# Patient Record
Sex: FEMALE | Race: WHITE
[De-identification: names, ages, dates, MRNs, and addresses within clinical notes are randomized per-mention and may not be internally consistent; named-entity substitution may affect disease eponyms.]

---

## 2020-12-03 ENCOUNTER — HOSPITAL ENCOUNTER (INPATIENT)
Dept: HOSPITAL 7 - FB.MS | Age: 80
LOS: 13 days | Discharge: HOME HEALTH | DRG: 560 | End: 2020-12-16
Attending: FAMILY MEDICINE | Admitting: FAMILY MEDICINE
Payer: MEDICARE

## 2020-12-03 DIAGNOSIS — I25.118: ICD-10-CM

## 2020-12-03 DIAGNOSIS — F41.9: ICD-10-CM

## 2020-12-03 DIAGNOSIS — Z96.642: ICD-10-CM

## 2020-12-03 DIAGNOSIS — J44.9: ICD-10-CM

## 2020-12-03 DIAGNOSIS — R53.81: ICD-10-CM

## 2020-12-03 DIAGNOSIS — K21.9: ICD-10-CM

## 2020-12-03 DIAGNOSIS — R07.89: ICD-10-CM

## 2020-12-03 DIAGNOSIS — F33.1: ICD-10-CM

## 2020-12-03 DIAGNOSIS — D63.1: ICD-10-CM

## 2020-12-03 DIAGNOSIS — K29.70: ICD-10-CM

## 2020-12-03 DIAGNOSIS — M54.9: ICD-10-CM

## 2020-12-03 DIAGNOSIS — G89.29: ICD-10-CM

## 2020-12-03 DIAGNOSIS — E78.00: ICD-10-CM

## 2020-12-03 DIAGNOSIS — K29.50: ICD-10-CM

## 2020-12-03 DIAGNOSIS — H91.90: ICD-10-CM

## 2020-12-03 DIAGNOSIS — G47.30: ICD-10-CM

## 2020-12-03 DIAGNOSIS — I12.9: ICD-10-CM

## 2020-12-03 DIAGNOSIS — N18.30: ICD-10-CM

## 2020-12-03 DIAGNOSIS — H54.7: ICD-10-CM

## 2020-12-03 DIAGNOSIS — E87.5: ICD-10-CM

## 2020-12-03 DIAGNOSIS — R00.1: ICD-10-CM

## 2020-12-03 DIAGNOSIS — N17.9: ICD-10-CM

## 2020-12-03 DIAGNOSIS — K56.7: ICD-10-CM

## 2020-12-03 DIAGNOSIS — Z98.49: ICD-10-CM

## 2020-12-03 DIAGNOSIS — J98.11: ICD-10-CM

## 2020-12-03 DIAGNOSIS — Z79.899: ICD-10-CM

## 2020-12-03 DIAGNOSIS — Z79.82: ICD-10-CM

## 2020-12-03 DIAGNOSIS — J30.9: ICD-10-CM

## 2020-12-03 DIAGNOSIS — K59.00: ICD-10-CM

## 2020-12-03 DIAGNOSIS — Z47.1: Primary | ICD-10-CM

## 2020-12-03 PROCEDURE — P9016 RBC LEUKOCYTES REDUCED: HCPCS

## 2020-12-03 RX ADMIN — LACTULOSE SCH GM: 10 SOLUTION ORAL at 20:00

## 2020-12-03 RX ADMIN — MORPHINE SULFATE SCH MG: 15 TABLET, EXTENDED RELEASE ORAL at 20:10

## 2020-12-03 RX ADMIN — MOMETASONE FUROATE AND FORMOTEROL FUMARATE DIHYDRATE SCH PUFF: 200; 5 AEROSOL RESPIRATORY (INHALATION) at 20:00

## 2020-12-03 NOTE — PCM.HP.2
H&P History of Present Illness





- General


Date of Service: 12/03/20


Admit Problem/Dx: 


                           Admission Diagnosis/Problem





Admission Diagnosis/Problem      Hip fracture requiring operative repair








Source of Information: Patient


History Limitations: Reports: No Limitations





- History of Present Illness


Initial Comments - Free Text/Narative: 


Deena is a 81 yo female who had CARRILLO( left) last week.She is here for rehab.She

complains of being tired,but her hip pain is much improved,and relived by 

Tyenol. She had post anemia( hgb 7.9) on discharge. Her pas medical history is 

extensive,with HTN,CAD,COPD,chronic chest pain,anxiety,Constipation,CKD Stage 

III 


  ** left hip


Pain Score (Numeric/FACES): 5





- Related Data


Allergies/Adverse Reactions: 


                                    Allergies











Allergy/AdvReac Type Severity Reaction Status Date / Time


 


No Known Allergies Allergy   Verified 11/21/18 09:00











Home Medications: 


                                    Home Meds





Omeprazole [Prilosec] 20 mg PO DAILY@0600 12/06/14 [History]


atorvaSTATin [Lipitor] 10 mg PO BEDTIME 11/20/18 [History]


Acetaminophen [Tylenol] 650 mg PO Q4H PRN 12/03/20 [History]


Albuterol Sulfate 3 ml IH QID PRN 12/03/20 [History]


Albuterol [Ventolin HFA] 2 puff IH Q4H PRN 12/03/20 [History]


Aspirin [Halfprin] 81 mg PO BID 12/03/20 [History]


Diltiazem [Dilacor XR] 240 mg PO DAILY 12/03/20 [History]


Docusate Sodium 100 mg PO DAILY 12/03/20 [History]


Docusate Sodium/Sennosides [Senna Plus] 1 tab PO BID 12/03/20 [History]


Ferrous Fumarate/Vitamin C [Vitron-C] 1 tab PO DAILY 12/03/20 [History]


Fluticasone Propion/Salmeterol [Advair 250-50 Diskus] 1 puff IH BID 12/03/20 

[History]


Lactulose [Chronulac] 15 ml PO TID 12/03/20 [History]


Morphine [MS Contin] 15 mg PO BID 12/03/20 [History]


Ondansetron [Zofran ODT] 4 mg PO Q4H PRN 12/03/20 [History]


Oxybutynin 5 mg PO TID 12/03/20 [History]


Venlafaxine [Effexor XR] 150 mg PO DAILY 12/03/20 [History]


oxyCODONE 5 mg PO Q4H PRN 12/03/20 [History]


oxyCODONE 10 mg PO Q4H PRN 12/03/20 [History]


polyethylene glycoL 3350 [MiraLAX] 17 gm PO DAILY 12/03/20 [History]











Past Medical History


HEENT History: Reports: Allergic Rhinitis, Cataract, Glaucoma, Hard of Hearing, 

Impaired Vision


Cardiovascular History: Reports: High Cholesterol, Hypertension, Other (See 

Below)


Other Cardiovascular History: ASHD


Respiratory History: Reports: Sleep Apnea, Other (See Below)


Other Respiratory History: TOBACCO USER


Gastrointestinal History: Reports: Colon Polyp, GERD


Genitourinary History: Reports: Urinary Incontinence


OB/GYN History: Reports: Pregnancy, Other (See Below)


Other OB/BYN History: ABNORMAL PAP


Musculoskeletal History: Reports: Back Pain, Chronic, Other (See Below)


Other Musculoskeletal History: LUMBAR SPINAL STENOSIS, DDD


Neurological History: 


Psychiatric History: Reports: Depression


Endocrine/Metabolic History: Reports: None


Hematologic History: Reports: Anemia


Immunologic History: Reports: None


Oncologic (Cancer) History: Reports: None


Dermatologic History: Reports: None





- Past Surgical History


Head Surgeries/Procedures: Reports: None


HEENT Surgical History: Reports: Cataract Surgery, Eye Surgery


Cardiovascular Surgical History: Reports: None


Respiratory Surgical History: Reports: None


GI Surgical History: Reports: Colonoscopy


Female  Surgical History: Reports: None


Endocrine Surgical History: Reports: None


Neurological Surgical History: Reports: Laminectomy, Lumbar Spine, Spinal Fusion


Musculoskeletal Surgical History: Reports: None


Oncologic Surgical History: Reports: None


Dermatological Surgical History: Reports: None





Social & Family History





- Family History


Family Medical History: No Pertinent Family History





- Caffeine Use


Caffeine Use: Reports: None





H&P Review of Systems





- Review of Systems:


Review Of Systems: Comprehensive ROS is negative, except as noted in HPI.





Exam





- Exam


Exam: See Below





- Vital Signs


Vital Signs: 


                                Last Vital Signs











Temp  98.4 F   12/03/20 14:15


 


Pulse  59 L  12/03/20 14:15


 


Resp  15   12/03/20 14:15


 


BP  125/46 L  12/03/20 14:15


 


Pulse Ox  94 L  12/03/20 14:15











Weight: 71.668 kg





- Exam


Quality Assessment: DVT Prophylaxis


General: Alert


HEENT: PERRLA


Neck: Supple


Lungs: Clear to Auscultation


Cardiovascular: Regular Rate


GI/Abdominal Exam: Normal Bowel Sounds, Soft


Extremities: Normal Inspection


Neurological: Cranial Nerves Intact


Neuro Extensive - Mental Status: Alert, Oriented x3


Psychiatric: Alert, Normal Affect





Sepsis Event Note





- Evaluation


Sepsis Screening Result: No Definite Risk





- Focused Exam


Vital Signs: 


                                   Vital Signs











  Temp Pulse Resp BP Pulse Ox


 


 12/03/20 14:15  98.4 F  59 L  15  125/46 L  94 L














- Problem List


(1) H/O total hip arthroplasty


SNOMED Code(s): 494292751215, 905056235010


   ICD Code: Z96.649 - PRESENCE OF UNSPECIFIED ARTIFICIAL HIP JOINT   Status: 

Acute   Current Visit: Yes   


Qualifiers: 


   Laterality: left   Qualified Code(s): Z96.642 - Presence of left artificial 

hip joint   





(2) Debility


SNOMED Code(s): 08878829


   ICD Code: R53.81 - OTHER MALAISE   Status: Acute   Current Visit: Yes   





(3) HTN (hypertension)


SNOMED Code(s): 60164434


   ICD Code: I10 - ESSENTIAL (PRIMARY) HYPERTENSION   Status: Chronic   Current 

Visit: Yes   


Qualifiers: 


   Hypertension type: essential hypertension   Qualified Code(s): I10 - 

Essential (primary) hypertension   





(4) CAD (coronary artery disease)


SNOMED Code(s): 24680250


   ICD Code: I25.10 - ATHSCL HEART DISEASE OF NATIVE CORONARY ARTERY W/O ANG 

PCTRS   Status: Chronic   Current Visit: Yes   


Qualifiers: 


   Coronary Disease-Associated Artery/Lesion type: native artery   Native vs. 

transplanted heart: native heart   Associated angina: with stable angina   

Qualified Code(s): I25.118 - Atherosclerotic heart disease of native coronary 

artery with other forms of angina pectoris   





(5) MDD (major depressive disorder)


SNOMED Code(s): 479778351


   ICD Code: F32.9 - MAJOR DEPRESSIVE DISORDER, SINGLE EPISODE, UNSPECIFIED   

Status: Acute   Current Visit: Yes   


Qualifiers: 


   Major depression recurrence: recurrent   Active/Remission status: currently 

active   Major depression episode severity: moderate   Qualified Code(s): F33.1 

- Major depressive disorder, recurrent, moderate   





(6) Constipation


SNOMED Code(s): 15400731


   ICD Code: K59.00 - CONSTIPATION, UNSPECIFIED   Status: Chronic   Current 

Visit: Yes   


Qualifiers: 


   Constipation type: unspecified constipation type   Qualified Code(s): K59.00 

- Constipation, unspecified   





(7) Anemia


SNOMED Code(s): 729506465


   ICD Code: D64.9 - ANEMIA, UNSPECIFIED   Status: Acute   Current Visit: Yes   


Qualifiers: 


   Anemia type: iron deficiency 





(8) Constipation


SNOMED Code(s): 61468085


   ICD Code: K59.00 - CONSTIPATION, UNSPECIFIED   Status: Chronic   Current 

Visit: No   


Qualifiers: 


   Constipation type: unspecified constipation type   Qualified Code(s): K59.00 

- Constipation, unspecified   


Problem List Initiated/Reviewed/Updated: Yes


Orders Last 24hrs: 


                               Active Orders 24 hr











 Category Date Time Status


 


 Patient Status [ADT] Routine ADT  12/03/20 14:47 Active


 


 Height and Weight [RC] WEEKLY Care  12/03/20 14:47 Active


 


 Oxygen Therapy [RC] PRN Care  12/03/20 14:47 Active


 


 RT Aerosol Therapy [RC] ASDIRECTED Care  12/03/20 14:52 Active


 


 RT Post Treatment Assessment [RC] Click to Edit Care  12/03/20 14:52 Active


 


 Up With Assistance [RC] ASDIRECTED Care  12/03/20 14:47 Active


 


 VTE/DVT Education [RC] Per Unit Routine Care  12/03/20 14:47 Active


 


 Vital Signs [RC] PER UNIT ROUTINE Care  12/03/20 14:47 Active


 


 OT Evaluation and Treatment [CONS] Routine Cons  12/03/20 14:47 Active


 


 PT Evaluation and Treatment [CONS] Routine Cons  12/03/20 14:47 Active


 


 Regular Diet [DIET] Diet  12/03/20 Breakfast Active


 


 Acetaminophen [TylenoL] Med  12/03/20 14:51 Active





 650 mg PO Q4H PRN   


 


 Albuterol [Proventil Neb Soln] Med  12/03/20 14:51 Active





 2.5 mg NEB QID PRN   


 


 Albuterol [Ventolin HFA] Med  12/03/20 14:51 Active





 0 gm INH Q4H PRN   


 


 Ascorbic Acid [Vitamin C] Med  12/04/20 09:00 Active





 500 mg PO DAILY   


 


 Aspirin [Halfprin] Med  12/03/20 21:00 Active





 81 mg PO BID   


 


 Diltiazem [Dilacor XR] Med  12/04/20 09:00 Active





 240 mg PO DAILY   


 


 Docusate Sodium [Colace] Med  12/04/20 09:00 Active





 100 mg PO DAILY   


 


 Docusate Sodium/Sennosides [Senna Plus] Med  12/03/20 21:00 Active





 1 tab PO BID   


 


 Ferrous Sulfate Med  12/04/20 09:00 Active





 325 mg PO DAILY   


 


 Lactulose [Chronulac] Med  12/03/20 21:00 Active





 10 gm PO TID   


 


 Mometasone/Formoterol [Dulera 200-5 MCG] Med  12/03/20 21:00 Active





 2 puff IH BID   


 


 Morphine [MS Contin] Med  12/03/20 21:00 Active





 15 mg PO BID   


 


 Ondansetron [Zofran ODT] Med  12/03/20 14:51 Active





 4 mg PO Q4H PRN   


 


 Oxybutynin Med  12/03/20 15:00 Active





 5 mg PO TID   


 


 Pantoprazole [ProTONIX***] Med  12/04/20 06:00 Active





 40 mg PO DAILY@0600   


 


 Venlafaxine [Effexor XR] Med  12/04/20 09:00 Active





 150 mg PO DAILY   


 


 atorvaSTATin [Lipitor] Med  12/03/20 21:00 Active





 10 mg PO BEDTIME   


 


 oxyCODONE Med  12/03/20 14:51 Active





 10 mg PO Q4H PRN   


 


 oxyCODONE Med  12/03/20 14:51 Active





 5 mg PO Q4H PRN   


 


 polyethylene glycoL 3350 [MiraLAX] Med  12/04/20 09:00 Active





 17 gm PO DAILY   


 


 Resuscitation Status Routine Resus Stat  12/03/20 14:47 Ordered








                                Medication Orders





Acetaminophen (Tylenol)  650 mg PO Q4H PRN


   PRN Reason: mild pain


   Last Admin: 12/03/20 15:52  Dose: 650 mg


   Documented by: JON


Albuterol (Ventolin Hfa)  0 gm INH Q4H PRN


   PRN Reason: Shortness of Breath


Albuterol (Proventil Neb Soln)  2.5 mg NEB QID PRN


   PRN Reason: Shortness of Breath


Ascorbic Acid (Vitamin C)  500 mg PO DAILY [facility]


Aspirin (Halfprin)  81 mg PO BID [facility]


   Stop: 12/30/20 23:59


Atorvastatin Calcium (Lipitor)  10 mg PO BEDTIME [facility]


Diltiazem HCl (Dilacor Xr)  240 mg PO DAILY [facility]


Docusate Sodium (Colace)  100 mg PO DAILY [facility]


Ferrous Sulfate (Ferrous Sulfate)  325 mg PO DAILY [facility]


Lactulose (Chronulac)  10 gm PO TID [facility]


Mometasone Furoate/Formoterol Fumar (Dulera 200-5 Mcg)  2 puff IH BID [facility]


Morphine Sulfate (Ms Contin)  15 mg PO BID [facility]


Ondansetron HCl (Zofran Odt)  4 mg PO Q4H PRN


   PRN Reason: Nausea


Oxybutynin Chloride (Oxybutynin)  5 mg PO TID [facility]


   Last Admin: 12/03/20 15:52  Dose: 5 mg


   Documented by: JON


Oxycodone HCl (Oxycodone)  5 mg PO Q4H PRN


   PRN Reason: pain 4-6/10


Oxycodone HCl (Oxycodone)  10 mg PO Q4H PRN


   PRN Reason: pain 7-10/10


Pantoprazole Sodium (Protonix***)  40 mg PO DAILY@0600 [facility]


Polyethylene Glycol (Miralax)  17 gm PO DAILY [facility]


Senna/Docusate Sodium (Senna Plus)  1 tab PO BID [facility]


Venlafaxine HCl (Effexor Xr)  150 mg PO DAILY [facility]








Assessment/Plan Comment:: 


Admit to [facility] bed/with orders from [facility]. PT/OT consult.Resume Home Meds-I 

appreciate Pharmacy's help.

## 2020-12-04 RX ADMIN — LACTULOSE SCH GM: 10 SOLUTION ORAL at 15:05

## 2020-12-04 RX ADMIN — MORPHINE SULFATE SCH MG: 15 TABLET, EXTENDED RELEASE ORAL at 08:34

## 2020-12-04 RX ADMIN — LACTULOSE SCH GM: 10 SOLUTION ORAL at 08:29

## 2020-12-04 RX ADMIN — DILTIAZEM HYDROCHLORIDE SCH MG: 240 CAPSULE, EXTENDED RELEASE ORAL at 08:29

## 2020-12-04 RX ADMIN — ONDANSETRON PRN MG: 4 TABLET, ORALLY DISINTEGRATING ORAL at 20:32

## 2020-12-04 RX ADMIN — MORPHINE SULFATE SCH MG: 15 TABLET, EXTENDED RELEASE ORAL at 20:33

## 2020-12-04 RX ADMIN — MOMETASONE FUROATE AND FORMOTEROL FUMARATE DIHYDRATE SCH PUFF: 200; 5 AEROSOL RESPIRATORY (INHALATION) at 08:28

## 2020-12-04 RX ADMIN — ONDANSETRON PRN MG: 4 TABLET, ORALLY DISINTEGRATING ORAL at 05:57

## 2020-12-04 RX ADMIN — LACTULOSE SCH GM: 10 SOLUTION ORAL at 20:27

## 2020-12-04 RX ADMIN — MOMETASONE FUROATE AND FORMOTEROL FUMARATE DIHYDRATE SCH PUFF: 200; 5 AEROSOL RESPIRATORY (INHALATION) at 20:27

## 2020-12-05 RX ADMIN — DILTIAZEM HYDROCHLORIDE SCH MG: 240 CAPSULE, EXTENDED RELEASE ORAL at 08:26

## 2020-12-05 RX ADMIN — ONDANSETRON PRN MG: 4 TABLET, ORALLY DISINTEGRATING ORAL at 11:06

## 2020-12-05 RX ADMIN — MOMETASONE FUROATE AND FORMOTEROL FUMARATE DIHYDRATE SCH PUFF: 200; 5 AEROSOL RESPIRATORY (INHALATION) at 08:18

## 2020-12-05 RX ADMIN — MOMETASONE FUROATE AND FORMOTEROL FUMARATE DIHYDRATE SCH PUFF: 200; 5 AEROSOL RESPIRATORY (INHALATION) at 20:36

## 2020-12-05 RX ADMIN — LACTULOSE SCH GM: 10 SOLUTION ORAL at 08:18

## 2020-12-05 RX ADMIN — LACTULOSE SCH GM: 10 SOLUTION ORAL at 20:36

## 2020-12-05 RX ADMIN — MORPHINE SULFATE SCH MG: 15 TABLET, EXTENDED RELEASE ORAL at 08:20

## 2020-12-05 RX ADMIN — LACTULOSE SCH GM: 10 SOLUTION ORAL at 14:25

## 2020-12-05 RX ADMIN — MORPHINE SULFATE SCH MG: 15 TABLET, EXTENDED RELEASE ORAL at 20:40

## 2020-12-05 RX ADMIN — ONDANSETRON PRN MG: 4 TABLET, ORALLY DISINTEGRATING ORAL at 05:38

## 2020-12-06 RX ADMIN — MOMETASONE FUROATE AND FORMOTEROL FUMARATE DIHYDRATE SCH PUFF: 200; 5 AEROSOL RESPIRATORY (INHALATION) at 20:37

## 2020-12-06 RX ADMIN — LACTULOSE SCH GM: 10 SOLUTION ORAL at 14:29

## 2020-12-06 RX ADMIN — LACTULOSE SCH GM: 10 SOLUTION ORAL at 20:37

## 2020-12-06 RX ADMIN — LACTULOSE SCH GM: 10 SOLUTION ORAL at 09:34

## 2020-12-06 RX ADMIN — ONDANSETRON PRN MG: 4 TABLET, ORALLY DISINTEGRATING ORAL at 12:20

## 2020-12-06 RX ADMIN — MORPHINE SULFATE SCH MG: 15 TABLET, EXTENDED RELEASE ORAL at 20:37

## 2020-12-06 RX ADMIN — MORPHINE SULFATE SCH MG: 15 TABLET, EXTENDED RELEASE ORAL at 09:36

## 2020-12-06 RX ADMIN — DILTIAZEM HYDROCHLORIDE SCH MG: 240 CAPSULE, EXTENDED RELEASE ORAL at 09:35

## 2020-12-06 RX ADMIN — MOMETASONE FUROATE AND FORMOTEROL FUMARATE DIHYDRATE SCH PUFF: 200; 5 AEROSOL RESPIRATORY (INHALATION) at 09:35

## 2020-12-07 RX ADMIN — MOMETASONE FUROATE AND FORMOTEROL FUMARATE DIHYDRATE SCH PUFF: 200; 5 AEROSOL RESPIRATORY (INHALATION) at 08:18

## 2020-12-07 RX ADMIN — MOMETASONE FUROATE AND FORMOTEROL FUMARATE DIHYDRATE SCH PUFF: 200; 5 AEROSOL RESPIRATORY (INHALATION) at 20:08

## 2020-12-07 RX ADMIN — DILTIAZEM HYDROCHLORIDE SCH MG: 240 CAPSULE, EXTENDED RELEASE ORAL at 08:17

## 2020-12-07 RX ADMIN — MORPHINE SULFATE SCH MG: 15 TABLET, EXTENDED RELEASE ORAL at 08:14

## 2020-12-07 RX ADMIN — ONDANSETRON PRN MG: 4 TABLET, ORALLY DISINTEGRATING ORAL at 14:08

## 2020-12-07 RX ADMIN — ONDANSETRON PRN MG: 4 TABLET, ORALLY DISINTEGRATING ORAL at 08:13

## 2020-12-07 RX ADMIN — LACTULOSE SCH GM: 10 SOLUTION ORAL at 14:08

## 2020-12-07 RX ADMIN — ONDANSETRON PRN MG: 4 TABLET, ORALLY DISINTEGRATING ORAL at 20:09

## 2020-12-07 RX ADMIN — LACTULOSE SCH GM: 10 SOLUTION ORAL at 20:09

## 2020-12-07 RX ADMIN — LACTULOSE SCH GM: 10 SOLUTION ORAL at 08:17

## 2020-12-07 RX ADMIN — MORPHINE SULFATE SCH MG: 15 TABLET, EXTENDED RELEASE ORAL at 20:09

## 2020-12-08 RX ADMIN — MOMETASONE FUROATE AND FORMOTEROL FUMARATE DIHYDRATE SCH PUFF: 200; 5 AEROSOL RESPIRATORY (INHALATION) at 20:00

## 2020-12-08 RX ADMIN — LACTULOSE SCH GM: 10 SOLUTION ORAL at 08:30

## 2020-12-08 RX ADMIN — DILTIAZEM HYDROCHLORIDE SCH MG: 240 CAPSULE, EXTENDED RELEASE ORAL at 08:33

## 2020-12-08 RX ADMIN — MORPHINE SULFATE SCH MG: 15 TABLET, EXTENDED RELEASE ORAL at 20:07

## 2020-12-08 RX ADMIN — MORPHINE SULFATE SCH MG: 15 TABLET, EXTENDED RELEASE ORAL at 08:28

## 2020-12-08 RX ADMIN — ONDANSETRON PRN MG: 4 TABLET, ORALLY DISINTEGRATING ORAL at 08:28

## 2020-12-08 RX ADMIN — MOMETASONE FUROATE AND FORMOTEROL FUMARATE DIHYDRATE SCH PUFF: 200; 5 AEROSOL RESPIRATORY (INHALATION) at 08:36

## 2020-12-08 NOTE — PCM.PN
- General Info


Date of Service: 12/08/20


Subjective Update: 





Pamelas nausea is better today, has small emesis yesterday during PT/OT. She 

was unable to take Zofran as it was too early after last dose given, so she 

drank some 7up which resolved her symptoms. Eating breakfast this morning 

without issue. She has only had Tramadol x 1 since ordered. She also has been 

having bradycardia last couple of days, she is on Diltiazem 240 mg daily. 





- Patient Data


Vitals - Most Recent: 


                                Last Vital Signs











Temp  97.6 F   12/07/20 06:27


 


Pulse  62   12/07/20 06:27


 


Resp  18   12/07/20 06:27


 


BP  143/54 H  12/07/20 06:27


 


Pulse Ox  97   12/07/20 06:27











Weight - Most Recent: 158 lb


Med Orders - Current: 


                               Current Medications





Acetaminophen (Tylenol Extra Strength)  500 mg PO Q6H [facility]


   Last Admin: 12/08/20 05:20 Dose:  500 mg


   Documented by: 


Al Hydroxide/Mg Hydroxide (Mag-Al Susp)  30 ml PO Q4H PRN


   PRN Reason: Heartburn


   Last Admin: 12/07/20 12:15 Dose:  30 ml


   Documented by: 


Albuterol (Ventolin Hfa)  0 gm INH Q4H PRN


   PRN Reason: Shortness of Breath


Albuterol (Proventil Neb Soln)  2.5 mg NEB QID PRN


   PRN Reason: Shortness of Breath


Ascorbic Acid (Vitamin C)  500 mg PO DAILY [facility]


   Last Admin: 12/08/20 08:34 Dose:  500 mg


   Documented by: 


Aspirin (Halfprin)  81 mg PO BID [facility]


   Stop: 12/30/20 23:59


   Last Admin: 12/08/20 08:33 Dose:  81 mg


   Documented by: 


Aspirin (Halfprin)  81 mg PO DAILY [facility]


Atorvastatin Calcium (Lipitor)  10 mg PO BEDTIME [facility]


   Last Admin: 12/07/20 20:09 Dose:  10 mg


   Documented by: 


Diltiazem HCl (Cardizem Cd)  120 mg PO DAILY [facility]


Docusate Sodium (Colace)  100 mg PO DAILY [facility]


   Last Admin: 12/08/20 08:32 Dose:  100 mg


   Documented by: 


Ferrous Sulfate (Ferrous Sulfate)  325 mg PO DAILY [facility]


   Last Admin: 12/08/20 08:36 Dose:  325 mg


   Documented by: 


Ibuprofen (Motrin)  200 mg PO Q6H [facility]


   Last Admin: 12/08/20 05:20 Dose:  200 mg


   Documented by: 


Lactulose (Chronulac)  10 gm PO TID [facility]


   Last Admin: 12/08/20 08:30 Dose:  10 gm


   Documented by: 


Mometasone Furoate/Formoterol Fumar (Dulera 200-5 Mcg)  2 puff IH BID [facility]


   Last Admin: 12/08/20 08:36 Dose:  2 puff


   Documented by: 


Morphine Sulfate (Ms Contin)  15 mg PO BID [facility]


   Last Admin: 12/08/20 08:28 Dose:  15 mg


   Documented by: 


Ondansetron HCl (Zofran Odt)  4 mg PO Q4H PRN


   PRN Reason: Nausea


   Last Admin: 12/08/20 08:28 Dose:  4 mg


   Documented by: 


Oxybutynin Chloride (Oxybutynin)  5 mg PO TID [facility]


   Last Admin: 12/08/20 08:32 Dose:  5 mg


   Documented by: 


Pantoprazole Sodium (Protonix***)  40 mg PO DAILY@0600 [facility]


   Last Admin: 12/08/20 05:21 Dose:  40 mg


   Documented by: 


Polyethylene Glycol (Miralax)  17 gm PO DAILY [facility]


   Last Admin: 12/08/20 08:30 Dose:  17 gm


   Documented by: 


Senna/Docusate Sodium (Senna Plus)  1 tab PO BID [facility]


   Last Admin: 12/08/20 08:34 Dose:  1 tab


   Documented by: 


Tramadol HCl (Ultram)  50 mg PO Q6H PRN


   PRN Reason: Pain (severe 7-10)


   Last Admin: 12/07/20 17:01 Dose:  50 mg


   Documented by: 


Venlafaxine HCl (Effexor Xr)  150 mg PO DAILY [facility]


   Last Admin: 12/08/20 08:34 Dose:  150 mg


   Documented by: 





Discontinued Medications





Acetaminophen (Tylenol)  650 mg PO Q4H PRN


   PRN Reason: mild pain


   Last Admin: 12/04/20 06:54 Dose:  650 mg


   Documented by: 


Diltiazem HCl (Dilacor Xr)  240 mg PO DAILY [facility]


   Last Admin: 12/08/20 08:33 Dose:  240 mg


   Documented by: 


Oxycodone HCl (Oxycodone)  5 mg PO Q4H PRN


   PRN Reason: pain 4-6/10


   Stop: 12/06/20 16:00


   Last Admin: 12/06/20 11:18 Dose:  5 mg


   Documented by: 


Oxycodone HCl (Oxycodone)  10 mg PO Q4H PRN


   PRN Reason: pain 7-10/10


   Stop: 12/06/20 16:00


   Last Admin: 12/03/20 19:00 Dose:  10 mg


   Documented by: 











- Exam


General: Alert, Oriented, Cooperative, No Acute Distress


Lungs: Clear to Auscultation, Normal Respiratory Effort.  No: Crackles, Wheezing


Cardiovascular: Bradycardia


GI/Abdominal Exam: Normal Bowel Sounds, Soft, Non-Tender, No Distention





Sepsis Event Note





- Evaluation


Sepsis Screening Result: No Definite Risk





- Problem List & Annotations


(1) H/O total hip arthroplasty


SNOMED Code(s): 200498017298, 341423544883


   Code(s): Z96.649 - PRESENCE OF UNSPECIFIED ARTIFICIAL HIP JOINT   Status: 

Acute   Current Visit: Yes   


Qualifiers: 


   Laterality: left   Qualified Code(s): Z96.642 - Presence of left artificial 

hip joint   





(2) Anemia


SNOMED Code(s): 916558992


   Code(s): D64.9 - ANEMIA, UNSPECIFIED   Status: Chronic   Current Visit: Yes  




Qualifiers: 


   Anemia type: iron deficiency 





(3) MDD (major depressive disorder)


SNOMED Code(s): 508107848


   Code(s): F32.9 - MAJOR DEPRESSIVE DISORDER, SINGLE EPISODE, UNSPECIFIED   

Status: Chronic   Current Visit: Yes   


Qualifiers: 


   Major depression recurrence: recurrent   Active/Remission status: currently 

active   Major depression episode severity: moderate   Qualified Code(s): F33.1 

- Major depressive disorder, recurrent, moderate   





(4) CAD (coronary artery disease)


SNOMED Code(s): 56069965


   Code(s): I25.10 - ATHSCL HEART DISEASE OF NATIVE CORONARY ARTERY W/O ANG 

PCTRS   Status: Chronic   Current Visit: Yes   


Qualifiers: 


   Coronary Disease-Associated Artery/Lesion type: native artery   Native vs. 

transplanted heart: native heart   Associated angina: with stable angina   

Qualified Code(s): I25.118 - Atherosclerotic heart disease of native coronary 

artery with other forms of angina pectoris   





(5) Constipation


SNOMED Code(s): 34035065


   Code(s): K59.00 - CONSTIPATION, UNSPECIFIED   Status: Chronic   Current 

Visit: Yes   


Qualifiers: 


   Constipation type: unspecified constipation type   Qualified Code(s): K59.00 

- Constipation, unspecified   





(6) HTN (hypertension)


SNOMED Code(s): 27368334


   Code(s): I10 - ESSENTIAL (PRIMARY) HYPERTENSION   Status: Chronic   Current 

Visit: Yes   


Qualifiers: 


   Hypertension type: essential hypertension   Qualified Code(s): I10 - 

Essential (primary) hypertension   





(7) Osteoarthritis


SNOMED Code(s): 462937820


   Code(s): M19.90 - UNSPECIFIED OSTEOARTHRITIS, UNSPECIFIED SITE   Status: 

Chronic   Current Visit: Yes   


Qualifiers: 


   Osteoarthritis location: multiple joints 





- Problem List Review


Problem List Initiated/Reviewed/Updated: Yes





- My Orders


Last 24 Hours: 


My Active Orders





12/07/20 11:11


Anti-Embolism Stockings AK [Antiembolic Hose] [OM.PC] Routine 





12/07/20 12:07


Alum Hydroxide/Mag Hydroxide [Mag-Al Susp]   30 ml PO Q4H PRN 





12/09/20 09:00


Diltiazem [Cardizem CD]   120 mg PO DAILY 














- Plan


Plan:: 


1. During OT therapy this morning, she was retching and complaining of chest 

pain, will get EKG and troponin stat. 


2. Bradycardia: will decrease Diltiazem 120 mg daily, will monitor and adjust as

necessary.


3. Left CARRILLO: continue PT/OT, home MSContin bid, tylenol 500 mg/ibuprofen 200 mg 

q6h scheduled and Tramadol as needed. Will continue to monitor her nausea and 

adjust her medications as needed.


4. Care conference today at 1pm.

## 2020-12-09 RX ADMIN — MOMETASONE FUROATE AND FORMOTEROL FUMARATE DIHYDRATE SCH PUFF: 200; 5 AEROSOL RESPIRATORY (INHALATION) at 08:35

## 2020-12-09 RX ADMIN — MOMETASONE FUROATE AND FORMOTEROL FUMARATE DIHYDRATE SCH PUFF: 200; 5 AEROSOL RESPIRATORY (INHALATION) at 20:07

## 2020-12-09 RX ADMIN — MORPHINE SULFATE SCH MG: 15 TABLET, EXTENDED RELEASE ORAL at 20:06

## 2020-12-09 RX ADMIN — SODIUM POLYSTYRENE SULFONATE SCH: 15 SUSPENSION ORAL; RECTAL at 16:33

## 2020-12-09 RX ADMIN — ONDANSETRON PRN MG: 4 TABLET, ORALLY DISINTEGRATING ORAL at 14:43

## 2020-12-09 RX ADMIN — ALBUTEROL SULFATE PRN MG: 2.5 SOLUTION RESPIRATORY (INHALATION) at 19:48

## 2020-12-09 RX ADMIN — SODIUM POLYSTYRENE SULFONATE SCH GM: 15 SUSPENSION ORAL; RECTAL at 08:36

## 2020-12-09 RX ADMIN — HYDROXYZINE HYDROCHLORIDE PRN MG: 50 INJECTION, SOLUTION INTRAMUSCULAR at 11:21

## 2020-12-09 RX ADMIN — HYDROXYZINE HYDROCHLORIDE PRN MG: 50 INJECTION, SOLUTION INTRAMUSCULAR at 22:01

## 2020-12-09 RX ADMIN — MORPHINE SULFATE SCH MG: 15 TABLET, EXTENDED RELEASE ORAL at 08:34

## 2020-12-09 NOTE — PCM.PN
- General Info


Date of Service: 12/09/20


Subjective Update: 





Having dry heaves today, spit up some mucus and scant amount of bile after her 

pills this morning. Did not vomit her pills up. States she is having heartburn 

but no specific site of pain. Her potassium came up this morning. Not on 

anything that would elevate her potassium per pharmacy. Earl her son was 

notified of change in her condition. 





- Patient Data


Vitals - Most Recent: 


                                Last Vital Signs











Temp  98.1 F   12/09/20 07:20


 


Pulse  46 L  12/09/20 08:35


 


Resp  20   12/09/20 07:20


 


BP  120/51 L  12/09/20 08:35


 


Pulse Ox  95   12/09/20 14:00








                                        





Orthostatic Blood Pressure [     117/52


Standing]                        


Orthostatic Blood Pressure [     165/49


Sitting]                         








Weight - Most Recent: 158 lb


I&O - Last 24 Hours: 


                                 Intake & Output











 12/09/20 12/09/20 12/09/20





 06:59 14:59 22:59


 


Intake Total 893 932 


 


Balance 893 932 











Lab Results Last 24 Hours: 


                         Laboratory Results - last 24 hr











  12/09/20 12/09/20 12/09/20 Range/Units





  06:15 10:50 10:50 


 


Sodium  130 L    (135-145)  mmol/L


 


Potassium  6.2 H*    (3.5-5.3)  mmol/L


 


Chloride  99 L    (100-110)  mmol/L


 


Carbon Dioxide  21    (21-32)  mmol/L


 


BUN  40 H    (7-18)  mg/dL


 


Creatinine  2.2 H*    (0.55-1.02)  mg/dL


 


Est Cr Clr Drug Dosing  16.13    mL/min


 


Estimated GFR (MDRD)  21 L    (>60)  


 


BUN/Creatinine Ratio  18.2    (9-20)  


 


Glucose  113    ()  mg/dL


 


Calcium  8.1 L    (8.6-10.2)  mg/dL


 


Ur Random Creatinine    51  mg/dL


 


Ur Random Sodium   23   mmol/L











Med Orders - Current: 


                               Current Medications





Acetaminophen (Tylenol Extra Strength)  500 mg PO Q6H [facility]LLEN


   Last Admin: 12/09/20 12:55 Dose:  500 mg


   Documented by: 


Albuterol (Ventolin Hfa)  0 gm INH Q4H PRN


   PRN Reason: Shortness of Breath


Albuterol (Proventil Neb Soln)  2.5 mg NEB QID PRN


   PRN Reason: Shortness of Breath


Ascorbic Acid (Vitamin C)  500 mg PO DAILY [facility]


   Last Admin: 12/09/20 08:37 Dose:  500 mg


   Documented by: 


Aspirin (Halfprin)  81 mg PO BID [facility]


   Stop: 12/30/20 23:59


   Last Admin: 12/09/20 08:37 Dose:  81 mg


   Documented by: 


Aspirin (Halfprin)  81 mg PO DAILY [facility]


Atorvastatin Calcium (Lipitor)  10 mg PO BEDTIME [facility]


   Last Admin: 12/08/20 20:01 Dose:  10 mg


   Documented by: 


Docusate Sodium (Colace)  100 mg PO DAILY [facility]


   Last Admin: 12/09/20 08:36 Dose:  100 mg


   Documented by: 


Ferrous Sulfate (Ferrous Sulfate)  325 mg PO DAILY [facility]


   Last Admin: 12/09/20 08:36 Dose:  325 mg


   Documented by: 


Hydroxyzine HCl (Vistaril)  50 mg IM Q6H PRN


   PRN Reason: Nausea/Vomiting


   Last Admin: 12/09/20 11:21 Dose:  50 mg


   Documented by: 


Lactated Ringer's (Ringers, Lactated)  1,000 mls @ 100 mls/hr IV ASDIRECTED [facility]


   Last Admin: 12/09/20 11:20 Dose:  100 mls/hr


   Documented by: 


Mometasone Furoate/Formoterol Fumar (Dulera 200-5 Mcg)  2 puff IH BID [facility]


   Last Admin: 12/09/20 08:35 Dose:  2 puff


   Documented by: 


Morphine Sulfate (Ms Contin)  15 mg PO BID [facility]


   Last Admin: 12/09/20 08:34 Dose:  15 mg


   Documented by: 


Nitroglycerin (Nitrostat)  0.4 mg SL Q5M PRN


   PRN Reason: Chest Pain


Ondansetron HCl (Zofran Odt)  4 mg PO Q4H PRN


   PRN Reason: Nausea


   Last Admin: 12/09/20 14:43 Dose:  4 mg


   Documented by: 


Oxybutynin Chloride (Oxybutynin)  5 mg PO BID [facility]


Pantoprazole Sodium (Protonix***)  40 mg PO DAILY@0600 [facility]


   Last Admin: 12/09/20 06:00 Dose:  40 mg


   Documented by: 


Polyethylene Glycol (Miralax)  17 gm PO DAILY [facility]


   Last Admin: 12/09/20 08:37 Dose:  17 gm


   Documented by: 


Senna/Docusate Sodium (Senna Plus)  1 tab PO BID [facility]


   Last Admin: 12/09/20 08:36 Dose:  1 tab


   Documented by: 


Simethicone (Simethicone)  80 mg PO QIDPCANDBED PRN


   PRN Reason: Gas


Sodium Polystyrene Sulfonate (Kayexalate)  15 gm PO Q6H [facility]


   Last Admin: 12/09/20 08:36 Dose:  15 gm


   Documented by: 


Tramadol HCl (Ultram)  50 mg PO Q6H PRN


   PRN Reason: Pain (severe 7-10)


   Stop: 12/10/20 23:59


   Last Admin: 12/07/20 17:01 Dose:  50 mg


   Documented by: 


Venlafaxine HCl (Effexor Xr)  150 mg PO DAILY [facility]


   Last Admin: 12/09/20 08:36 Dose:  150 mg


   Documented by: 





Discontinued Medications





Acetaminophen (Tylenol)  650 mg PO Q4H PRN


   PRN Reason: mild pain


   Last Admin: 12/04/20 06:54 Dose:  650 mg


   Documented by: 


Al Hydroxide/Mg Hydroxide (Mag-Al Susp)  30 ml PO Q4H PRN


   PRN Reason: Heartburn


   Last Admin: 12/07/20 12:15 Dose:  30 ml


   Documented by: 


Al Hydroxide/Mg Hydroxide 15 (ml/ Lidocaine HCl 15 ml)  0 ml PO ONETIME ONE


   Stop: 12/08/20 11:32


   Last Admin: 12/08/20 12:31 Dose:  30 ml


   Documented by: 


Diltiazem HCl (Dilacor Xr)  240 mg PO DAILY [facility]


   Last Admin: 12/08/20 08:33 Dose:  240 mg


   Documented by: 


Diltiazem HCl (Cardizem Cd)  120 mg PO DAILY [facility]


   Last Admin: 12/09/20 08:35 Dose:  120 mg


   Documented by: 


Famotidine (Pepcid)  20 mg IVPUSH ONETIME ONE


   Stop: 12/09/20 15:09


   Last Admin: 12/09/20 15:28 Dose:  20 mg


   Documented by: 


Ibuprofen (Motrin)  200 mg PO Q6H [facility]


   Last Admin: 12/08/20 12:34 Dose:  200 mg


   Documented by: 


Lactulose (Chronulac)  10 gm PO TID [facility]


   Last Admin: 12/08/20 08:30 Dose:  10 gm


   Documented by: 


Oxybutynin Chloride (Oxybutynin)  5 mg PO TID [facility]


   Last Admin: 12/09/20 08:37 Dose:  5 mg


   Documented by: 


Oxycodone HCl (Oxycodone)  5 mg PO Q4H PRN


   PRN Reason: pain 4-6/10


   Stop: 12/06/20 16:00


   Last Admin: 12/06/20 11:18 Dose:  5 mg


   Documented by: 


Oxycodone HCl (Oxycodone)  10 mg PO Q4H PRN


   PRN Reason: pain 7-10/10


   Stop: 12/06/20 16:00


   Last Admin: 12/03/20 19:00 Dose:  10 mg


   Documented by: 


Simethicone (Simethicone)  80 mg PO QIDPCANDBED [facility]


   Last Admin: 12/09/20 12:55 Dose:  80 mg


   Documented by: 











- Exam


General: Alert, Oriented, Cooperative, Mild Distress (sitting on edge of bed, 

dry heaving, gagging)


Lungs: Clear to Auscultation, Normal Respiratory Effort


Cardiovascular: Bradycardia


GI/Abdominal Exam: Soft, Non-Tender, No Distention, Abnormal Bowel Sounds (hypoa

ctive).  No: Guarding, Rigid, Rebound


Extremities: Pallor





Sepsis Event Note





- Evaluation


Sepsis Screening Result: No Definite Risk





- Focused Exam


Vital Signs: 


                                   Vital Signs











  Temp Pulse Pulse Resp BP BP Pulse Ox


 


 12/09/20 14:00       


 


 12/09/20 08:35   46 L    120/51 L  


 


 12/09/20 07:20  98.1 F   46 L  20   120/51 L  95


 


 12/09/20 06:29  98.2 F   52 L  20   128/49 L  96


 


 12/09/20 04:00  98 F      122/52 L  95














  Pulse Ox


 


 12/09/20 14:00  95


 


 12/09/20 08:35 


 


 12/09/20 07:20 


 


 12/09/20 06:29 


 


 12/09/20 04:00 














- Problem List & Annotations


(1) Hyperkalemia


SNOMED Code(s): 67453042


   Code(s): E87.5 - HYPERKALEMIA   Status: Acute   Current Visit: Yes   

Annotation/Comment:: 6.2 up from 5.9 yesterday, Kayaxelate started 15 mg qid, 

had a large bowel movement over noon hour. Repeat potassium at 1600. Also has 

Albuterol nebs as needed hyperkalemia. Blood pressures have been normal, 

orthostatic hypotension yesterday during therapy, would not do Lasix at this 

time. If it continues to go up, would need to transfer back to [place] for further

renal workup.    





(2) Nausea & vomiting


SNOMED Code(s): 77991740


   Code(s): R11.2 - NAUSEA WITH VOMITING, UNSPECIFIED   Status: Acute   Current 

Visit: Yes   


Qualifiers: 


   Vomiting type: unspecified 





(3) Chronic kidney disease (CKD), stage III (moderate)


SNOMED Code(s): 901020005


   Code(s): N18.30 - CHRONIC KIDNEY DISEASE, STAGE 3 UNSPECIFIED   Status: 

Chronic   Current Visit: Yes   Annotation/Comment:: Acute on chronic, Cr 1.8 on 

12/4, jumped yesterday to 2.2, remained 2.2 this morning. Urine Cr and Urine 

Sodium ordered and FeNa was 0.76% which would be suggestive of prerenal state. 

LR at 100 ml/hr, repeat labs in am.    





(4) H/O total hip arthroplasty


SNOMED Code(s): 451955608706, 653995509362


   Code(s): Z96.649 - PRESENCE OF UNSPECIFIED ARTIFICIAL HIP JOINT   Status: 

Acute   Current Visit: Yes   


Qualifiers: 


   Laterality: left   Qualified Code(s): Z96.642 - Presence of left artificial 

hip joint   





(5) Anemia


SNOMED Code(s): 117411824


   Code(s): D64.9 - ANEMIA, UNSPECIFIED   Status: Chronic   Current Visit: Yes  




Qualifiers: 


   Anemia type: iron deficiency 





(6) MDD (major depressive disorder)


SNOMED Code(s): 193959058


   Code(s): F32.9 - MAJOR DEPRESSIVE DISORDER, SINGLE EPISODE, UNSPECIFIED   

Status: Chronic   Current Visit: Yes   


Qualifiers: 


   Major depression recurrence: recurrent   Active/Remission status: currently 

active   Major depression episode severity: moderate   Qualified Code(s): F33.1 

- Major depressive disorder, recurrent, moderate   





(7) CAD (coronary artery disease)


SNOMED Code(s): 45584164


   Code(s): I25.10 - ATHSCL HEART DISEASE OF NATIVE CORONARY ARTERY W/O ANG 

PCTRS   Status: Chronic   Current Visit: Yes   


Qualifiers: 


   Coronary Disease-Associated Artery/Lesion type: native artery   Native vs. 

transplanted heart: native heart   Associated angina: with stable angina   

Qualified Code(s): I25.118 - Atherosclerotic heart disease of native coronary 

artery with other forms of angina pectoris   





(8) Constipation


SNOMED Code(s): 76624805


   Code(s): K59.00 - CONSTIPATION, UNSPECIFIED   Status: Chronic   Current 

Visit: Yes   


Qualifiers: 


   Constipation type: unspecified constipation type   Qualified Code(s): K59.00 

- Constipation, unspecified   





(9) HTN (hypertension)


SNOMED Code(s): 10029258


   Code(s): I10 - ESSENTIAL (PRIMARY) HYPERTENSION   Status: Chronic   Current 

Visit: Yes   


Qualifiers: 


   Hypertension type: essential hypertension   Qualified Code(s): I10 - 

Essential (primary) hypertension   





(10) Osteoarthritis


SNOMED Code(s): 066914883


   Code(s): M19.90 - UNSPECIFIED OSTEOARTHRITIS, UNSPECIFIED SITE   Status: 

Chronic   Current Visit: Yes   


Qualifiers: 


   Osteoarthritis location: multiple joints 





- Problem List Review


Problem List Initiated/Reviewed/Updated: Yes





- My Orders


Last 24 Hours: 


My Active Orders





12/08/20 15:15


Lactated Ringers [Ringers, Lactated] 1,000 ml IV ASDIRECTED 





12/09/20 08:00


Sodium Polystyrene Sulfonate [Kayexalate]   15 gm PO Q6H 





12/09/20 11:03


hydrOXYzine HCL [Vistaril]   50 mg IM Q6H PRN 





12/09/20 15:48


Simethicone   80 mg PO QIDPCANDBED PRN 





12/09/20 16:00


POTASSIUM,K [CHEM] Routine 





12/09/20 Dinner


Regular Diet [DIET] 





12/09/20 21:00


Oxybutynin   5 mg PO BID 





12/10/20 06:00


BASIC METABOLIC PANEL,BMP [CHEM] Routine 














- Plan


Plan:: 


1. Hyperkalemia: stopped Maalox, Kayaxelate 15 mg qid, Albuterol nebs q4h prn, 

if still remains high at 1600 would add calcium gluconate IV.


2. Acute on CKD: Cr 2.2, FeNa suggestive of prerenal state, LR at 100 ml/hr.


3. Bradycardia: discontinued Diltiazem and monitor vitals and adjust as 

necessary.


4. Nausea/vomiting: Continue Zofran, added Vistaril 50 mg IM q6h, Pepcid 20 mg 

IV x 1, if that helps her heartburn may start oral tomorrow night at 10 mg 

daily. Pantoprazole 40 mg daily.


5. Left CARRILLO: continue PT/OT, home MSContin bid, tylenol 500 mg q6h scheduled and

 Tramadol as needed, has not used since Monday night, stop date 12/10.

## 2020-12-10 RX ADMIN — ONDANSETRON PRN MG: 4 TABLET, ORALLY DISINTEGRATING ORAL at 05:59

## 2020-12-10 RX ADMIN — MOMETASONE FUROATE AND FORMOTEROL FUMARATE DIHYDRATE SCH PUFF: 200; 5 AEROSOL RESPIRATORY (INHALATION) at 21:10

## 2020-12-10 RX ADMIN — MOMETASONE FUROATE AND FORMOTEROL FUMARATE DIHYDRATE SCH PUFF: 200; 5 AEROSOL RESPIRATORY (INHALATION) at 10:03

## 2020-12-10 RX ADMIN — ALUMINUM HYDROXIDE AND MAGNESIUM HYDROXIDE PRN ML: 200; 200 SUSPENSION ORAL at 12:50

## 2020-12-10 RX ADMIN — ONDANSETRON PRN MG: 4 TABLET, ORALLY DISINTEGRATING ORAL at 00:29

## 2020-12-10 RX ADMIN — MORPHINE SULFATE SCH MG: 15 TABLET, EXTENDED RELEASE ORAL at 10:00

## 2020-12-10 RX ADMIN — MORPHINE SULFATE SCH MG: 15 TABLET, EXTENDED RELEASE ORAL at 21:17

## 2020-12-10 NOTE — CR
INDICATION:  Epigastric pain.  



ABDOMEN SERIES WITH CHEST ONE VIEW:  



AP upright view of the chest was obtained 12/10/20 and compared with 11/11/18.  
The heart remains normal in size shape, the aorta is tortuous with calcification
in the arch.  



Somewhat hyperaerated appearing lung is noted which may be on the basis of COPD 
but should be correlated clinically.  



The right costophrenic angle is blunted with some heavy markings in that area, 
possibly on the basis of relatively poor inspiration although minimal fibrosis 
and/or patchy minimal pneumonia and pleuritis is difficult to entirely exclude. 




No free air was noted under the hemidiaphragm leaves.  



Multiple air-fluid levels are noted in the right lower abdomen and upper pelvis,
which may be on the basis of a localized paralytic ileus, early gastroenteritis,
or nonspecific due to fluid ingestion, and should be correlated clinically.  No 
gross distention of the bowel loops is seen, although the bowel loops - colon 
show greater distention than on the previous examination - this is a relatively 
incidental finding.  There is noted a paucity of gas in the area of the rectum 
which could be on the basis of recent BM but should be correlated clinically.  



No definite nonvascular pathologic calcifications were identified.  

Aortic calcifications are noted.  

Interval placement of total hip arthroplasty is noted on the left.  

Clips in the lower pelvis most likely on the basis of previous inguinal 
herniorrhaphy.  Correlate clinically.  

Mild dextroconvex scoliosis lower lumbar spine is noted.  



No definite organomegaly or mass lesions were identified otherwise.  



IMPRESSION: 

1.  No definite acute process in the chest although a degree of obstructive 
airway disease and even minimal pneumonia and pleuritis versus fibrosis at the 
right costophrenic angle cannot be excluded.  Evidence of ASD aorta is noted in 
the chest and abdomen.  

2.  Air-fluid levels in the right lower abdomen - upper pelvis etiology 
indeterminate, followup may be warranted.  Findings may be on the basis of 
localized paralytic ileus, gastroenteritis, etc.  

3.  Scoliosis. 

4.  Total hip arthroplasty on the left.  

[facility]

## 2020-12-10 NOTE — PCM.PN
- General Info


Date of Service: 12/10/20


Subjective Update: 





Her potassium corrected with Kayexalate yesterday, had a couple of good bowel 

movements. Still having heartburn, dry heaves, states that it is the same as 

when she was in [place] and even when she was at home. She thinks she still has 

her gallbladder. Denies any new symptoms. 





- Patient Data


Vitals - Most Recent: 


                                Last Vital Signs











Temp  98.1 F   12/10/20 07:00


 


Pulse  80   12/10/20 07:00


 


Resp  18   12/10/20 07:00


 


BP  163/76 H  12/10/20 07:00


 


Pulse Ox  96   12/10/20 07:00








                                        





Orthostatic Blood Pressure [     117/52


Standing]                        


Orthostatic Blood Pressure [     165/49


Sitting]                         








Weight - Most Recent: 158 lb


I&O - Last 24 Hours: 


                                 Intake & Output











 12/09/20 12/10/20 12/10/20





 22:59 06:59 14:59


 


Intake Total 657 873 


 


Balance 657 873 











Lab Results Last 24 Hours: 


                         Laboratory Results - last 24 hr











  12/09/20 12/09/20 12/09/20 Range/Units





  10:50 10:50 16:10 


 


Sodium     (135-145)  mmol/L


 


Potassium    5.2  D  (3.5-5.3)  mmol/L


 


Chloride     (100-110)  mmol/L


 


Carbon Dioxide     (21-32)  mmol/L


 


BUN     (7-18)  mg/dL


 


Creatinine     (0.55-1.02)  mg/dL


 


Est Cr Clr Drug Dosing     mL/min


 


Estimated GFR (MDRD)     (>60)  


 


BUN/Creatinine Ratio     (9-20)  


 


Glucose     ()  mg/dL


 


Calcium     (8.6-10.2)  mg/dL


 


Ur Random Creatinine   51   mg/dL


 


Ur Random Sodium  23    mmol/L














  12/10/20 Range/Units





  06:35 


 


Sodium  137  (135-145)  mmol/L


 


Potassium  4.6  (3.5-5.3)  mmol/L


 


Chloride  101  (100-110)  mmol/L


 


Carbon Dioxide  22  (21-32)  mmol/L


 


BUN  28 H D  (7-18)  mg/dL


 


Creatinine  1.6 H  (0.55-1.02)  mg/dL


 


Est Cr Clr Drug Dosing  22.18  mL/min


 


Estimated GFR (MDRD)  31 L  (>60)  


 


BUN/Creatinine Ratio  17.5  (9-20)  


 


Glucose  95  ()  mg/dL


 


Calcium  8.5 L  (8.6-10.2)  mg/dL


 


Ur Random Creatinine   mg/dL


 


Ur Random Sodium   mmol/L











Med Orders - Current: 


                               Current Medications





Acetaminophen (Tylenol Extra Strength)  500 mg PO Q6H [facility]


   Last Admin: 12/10/20 05:56 Dose:  500 mg


   Documented by: 


Albuterol (Ventolin Hfa)  0 gm INH Q4H PRN


   PRN Reason: Shortness of Breath


Albuterol (Proventil Neb Soln)  2.5 mg NEB QID PRN


   PRN Reason: Shortness of Breath


   Last Admin: 12/09/20 19:48 Dose:  2.5 mg


   Documented by: 


Amlodipine Besylate (Norvasc)  5 mg PO BEDTIME [facility]


Ascorbic Acid (Vitamin C)  500 mg PO DAILY [facility]


   Last Admin: 12/10/20 10:01 Dose:  500 mg


   Documented by: 


Aspirin (Halfprin)  81 mg PO BID [facility]


   Stop: 12/30/20 23:59


   Last Admin: 12/10/20 10:02 Dose:  81 mg


   Documented by: 


Aspirin (Halfprin)  81 mg PO DAILY [facility]


Atorvastatin Calcium (Lipitor)  10 mg PO BEDTIME [facility]


   Last Admin: 12/09/20 20:05 Dose:  10 mg


   Documented by: 


Al Hydroxide/Mg Hydroxide 15 (ml/ Lidocaine HCl 15 ml)  0 ml PO Q4H PRN


   PRN Reason: Dyspepsia


Docusate Sodium (Colace)  100 mg PO DAILY [facility]


   Last Admin: 12/10/20 10:03 Dose:  100 mg


   Documented by: 


Ferrous Sulfate (Ferrous Sulfate)  325 mg PO DAILY [facility]


   Last Admin: 12/10/20 10:01 Dose:  325 mg


   Documented by: 


Hydroxyzine HCl (Vistaril)  50 mg IM Q6H PRN


   PRN Reason: Nausea/Vomiting


   Last Admin: 12/09/20 22:01 Dose:  50 mg


   Documented by: 


Lactated Ringer's (Ringers, Lactated)  1,000 mls @ 100 mls/hr IV ASDIRECTED [facility]


   Last Admin: 12/10/20 06:42 Dose:  100 mls/hr


   Documented by: 


Mometasone Furoate/Formoterol Fumar (Dulera 200-5 Mcg)  2 puff IH BID [facility]


   Last Admin: 12/10/20 10:03 Dose:  2 puff


   Documented by: 


Morphine Sulfate (Ms Contin)  15 mg PO BID [facility]


   Last Admin: 12/10/20 10:00 Dose:  15 mg


   Documented by: 


Nitroglycerin (Nitrostat)  0.4 mg SL Q5M PRN


   PRN Reason: Chest Pain


Ondansetron HCl (Zofran Odt)  4 mg PO Q4H PRN


   PRN Reason: Nausea


   Last Admin: 12/10/20 05:59 Dose:  4 mg


   Documented by: 


Oxybutynin Chloride (Oxybutynin)  5 mg PO BID [facility]


   Last Admin: 12/10/20 10:02 Dose:  5 mg


   Documented by: 


Pantoprazole Sodium (Protonix***)  40 mg PO DAILY@0600 [facility]


   Last Admin: 12/10/20 05:59 Dose:  40 mg


   Documented by: 


Polyethylene Glycol (Miralax)  17 gm PO DAILY [facility]


   Last Admin: 12/10/20 10:10 Dose:  Not Given


   Documented by: 


Senna/Docusate Sodium (Senna Plus)  1 tab PO BID [facility]


   Last Admin: 12/10/20 10:02 Dose:  1 tab


   Documented by: 


Simethicone (Simethicone)  80 mg PO QIDPCANDBED PRN


   PRN Reason: Gas


Tramadol HCl (Ultram)  50 mg PO Q6H PRN


   PRN Reason: Pain (severe 7-10)


   Stop: 12/10/20 23:59


   Last Admin: 12/07/20 17:01 Dose:  50 mg


   Documented by: 


Venlafaxine HCl (Effexor Xr)  150 mg PO DAILY [facility]


   Last Admin: 12/10/20 10:03 Dose:  150 mg


   Documented by: 





Discontinued Medications





Acetaminophen (Tylenol)  650 mg PO Q4H PRN


   PRN Reason: mild pain


   Last Admin: 12/04/20 06:54 Dose:  650 mg


   Documented by: 


Al Hydroxide/Mg Hydroxide (Mag-Al Susp)  30 ml PO Q4H PRN


   PRN Reason: Heartburn


   Last Admin: 12/07/20 12:15 Dose:  30 ml


   Documented by: 


Al Hydroxide/Mg Hydroxide 15 (ml/ Lidocaine HCl 15 ml)  0 ml PO ONETIME ONE


   Stop: 12/08/20 11:32


   Last Admin: 12/08/20 12:31 Dose:  30 ml


   Documented by: 


Al Hydroxide/Mg Hydroxide 15 (ml/ Lidocaine HCl 15 ml)  0 ml PO ONETIME ONE


   Stop: 12/10/20 08:25


   Last Admin: 12/10/20 10:06 Dose:  30 ml


   Documented by: 


Diltiazem HCl (Dilacor Xr)  240 mg PO DAILY [facility]


   Last Admin: 12/08/20 08:33 Dose:  240 mg


   Documented by: 


Diltiazem HCl (Cardizem Cd)  120 mg PO DAILY [facility]


   Last Admin: 12/09/20 08:35 Dose:  120 mg


   Documented by: 


Famotidine (Pepcid)  20 mg IVPUSH ONETIME ONE


   Stop: 12/09/20 15:09


   Last Admin: 12/09/20 15:28 Dose:  20 mg


   Documented by: 


Ibuprofen (Motrin)  200 mg PO Q6H [facility]


   Last Admin: 12/08/20 12:34 Dose:  200 mg


   Documented by: 


Lactulose (Chronulac)  10 gm PO TID [facility]


   Last Admin: 12/08/20 08:30 Dose:  10 gm


   Documented by: 


Oxybutynin Chloride (Oxybutynin)  5 mg PO TID [facility]


   Last Admin: 12/09/20 17:06 Dose:  Not Given


   Documented by: 


Oxycodone HCl (Oxycodone)  5 mg PO Q4H PRN


   PRN Reason: pain 4-6/10


   Stop: 12/06/20 16:00


   Last Admin: 12/06/20 11:18 Dose:  5 mg


   Documented by: 


Oxycodone HCl (Oxycodone)  10 mg PO Q4H PRN


   PRN Reason: pain 7-10/10


   Stop: 12/06/20 16:00


   Last Admin: 12/03/20 19:00 Dose:  10 mg


   Documented by: 


Simethicone (Simethicone)  80 mg PO QIDPCANDBED [facility]


   Last Admin: 12/09/20 12:55 Dose:  80 mg


   Documented by: 


Sodium Polystyrene Sulfonate (Kayexalate)  15 gm PO Q6H [facility]


   Last Admin: 12/09/20 16:33 Dose:  Not Given


   Documented by: 











- Exam


General: Alert, Oriented, Mild Distress (sitting at edge of bed initially)


Lungs: Clear to Auscultation, Normal Respiratory Effort


Cardiovascular: Regular Rate, Regular Rhythm


GI/Abdominal Exam: Soft, Non-Tender, No Distention, Abnormal Bowel Sounds


Extremities: Pedal Edema (trace edema), Pallor





Sepsis Event Note





- Evaluation


Sepsis Screening Result: No Definite Risk





- Focused Exam


Vital Signs: 


                                   Vital Signs











  Temp Pulse Resp BP Pulse Ox


 


 12/10/20 07:00  98.1 F  80  18  163/76 H  96














- Problem List & Annotations


(1) Hyperkalemia


SNOMED Code(s): 48783791


   Code(s): E87.5 - HYPERKALEMIA   Status: Resolved   Current Visit: Yes   

Annotation/Comment:: Resolved 5.2 last night, 4.6 this morning.    





(2) Nausea & vomiting


SNOMED Code(s): 27157867


   Code(s): R11.2 - NAUSEA WITH VOMITING, UNSPECIFIED   Status: Acute   Current 

Visit: Yes   


Qualifiers: 


   Vomiting type: unspecified 


Annotation/Comment:: Describes as more heartburn, states GI cocktail has helped 

the most. Pepcid had no change. Ordered GI cocktail q4h as needed dyspepsia.    





(3) Chronic kidney disease (CKD), stage III (moderate)


SNOMED Code(s): 682322495


   Code(s): N18.30 - CHRONIC KIDNEY DISEASE, STAGE 3 UNSPECIFIED   Status: 

Chronic   Current Visit: Yes   Annotation/Comment:: Cr 1.6 today. Sodium 

corrected. Slow IV fluids down, changed to clear diet until her heartburn is 

better controlled.    





(4) H/O total hip arthroplasty


SNOMED Code(s): 620956327196, 694354637281


   Code(s): Z96.649 - PRESENCE OF UNSPECIFIED ARTIFICIAL HIP JOINT   Status: 

Acute   Current Visit: Yes   


Qualifiers: 


   Laterality: left   Qualified Code(s): Z96.642 - Presence of left artificial 

hip joint   





(5) Anemia


SNOMED Code(s): 021444621


   Code(s): D64.9 - ANEMIA, UNSPECIFIED   Status: Chronic   Current Visit: Yes  




Qualifiers: 


   Anemia type: iron deficiency 





(6) MDD (major depressive disorder)


SNOMED Code(s): 210134446


   Code(s): F32.9 - MAJOR DEPRESSIVE DISORDER, SINGLE EPISODE, UNSPECIFIED   

Status: Chronic   Current Visit: Yes   


Qualifiers: 


   Major depression recurrence: recurrent   Active/Remission status: currently 

active   Major depression episode severity: moderate   Qualified Code(s): F33.1 

- Major depressive disorder, recurrent, moderate   





(7) CAD (coronary artery disease)


SNOMED Code(s): 20357196


   Code(s): I25.10 - ATHSCL HEART DISEASE OF NATIVE CORONARY ARTERY W/O ANG 

PCTRS   Status: Chronic   Current Visit: Yes   


Qualifiers: 


   Coronary Disease-Associated Artery/Lesion type: native artery   Native vs. 

transplanted heart: native heart   Associated angina: with stable angina   

Qualified Code(s): I25.118 - Atherosclerotic heart disease of native coronary 

artery with other forms of angina pectoris   





(8) Constipation


SNOMED Code(s): 42917820


   Code(s): K59.00 - CONSTIPATION, UNSPECIFIED   Status: Chronic   Current V

isit: Yes   


Qualifiers: 


   Constipation type: unspecified constipation type   Qualified Code(s): K59.00 

- Constipation, unspecified   





(9) HTN (hypertension)


SNOMED Code(s): 75134735


   Code(s): I10 - ESSENTIAL (PRIMARY) HYPERTENSION   Status: Chronic   Current 

Visit: Yes   


Qualifiers: 


   Hypertension type: essential hypertension   Qualified Code(s): I10 - 

Essential (primary) hypertension   





(10) Osteoarthritis


SNOMED Code(s): 637925034


   Code(s): M19.90 - UNSPECIFIED OSTEOARTHRITIS, UNSPECIFIED SITE   Status: 

Chronic   Current Visit: Yes   


Qualifiers: 


   Osteoarthritis location: multiple joints 





- Problem List Review


Problem List Initiated/Reviewed/Updated: Yes





- My Orders


Last 24 Hours: 


My Active Orders





12/09/20 11:03


hydrOXYzine HCL [Vistaril]   50 mg IM Q6H PRN 





12/09/20 15:48


Simethicone   80 mg PO QIDPCANDBED PRN 





12/09/20 21:00


Oxybutynin   5 mg PO BID 





12/10/20 Lunch


Clear Liquid Diet [DIET] 





12/10/20 10:42


Alum Hydroxide/Mag Hydroxide [Mag-Al Susp] 15 ml Lidocaine 2% [Xylocaine 2% 

Viscous] 15 ml PO Q4H 





12/10/20 21:00


amLODIPine [Norvasc]   5 mg PO BEDTIME 














- Plan


Plan:: 


1. Hyperkalemia:Resolved.


2. Acute on CKD: Cr 1.6, LR at 75 ml/hr.


3. Bradycardia: Resolved, HR in 80s today.


4. HTN: Add amlodipine 5 mg at bedtime, 75% of Diltiazem should be out of her 

system by tonight. BP elevated today. 


4. Nausea/vomiting: GI cocktail q4h as needed, Zofran & Vistaril as needed. 

Pantoprazole scheduled.


5. Left CARRILLO: continue PT/OT, home MSContin bid, tylenol 500 mg q6h scheduled and

 Tramadol stop date today.

## 2020-12-11 PROCEDURE — 30233N1 TRANSFUSION OF NONAUTOLOGOUS RED BLOOD CELLS INTO PERIPHERAL VEIN, PERCUTANEOUS APPROACH: ICD-10-PCS | Performed by: FAMILY MEDICINE

## 2020-12-11 RX ADMIN — MOMETASONE FUROATE AND FORMOTEROL FUMARATE DIHYDRATE SCH PUFF: 200; 5 AEROSOL RESPIRATORY (INHALATION) at 08:39

## 2020-12-11 RX ADMIN — ONDANSETRON PRN MG: 4 TABLET, ORALLY DISINTEGRATING ORAL at 20:19

## 2020-12-11 RX ADMIN — MOMETASONE FUROATE AND FORMOTEROL FUMARATE DIHYDRATE SCH PUFF: 200; 5 AEROSOL RESPIRATORY (INHALATION) at 20:15

## 2020-12-11 RX ADMIN — MORPHINE SULFATE SCH MG: 15 TABLET, EXTENDED RELEASE ORAL at 20:17

## 2020-12-11 RX ADMIN — ALBUTEROL SULFATE PRN MG: 2.5 SOLUTION RESPIRATORY (INHALATION) at 09:19

## 2020-12-11 RX ADMIN — MORPHINE SULFATE SCH MG: 15 TABLET, EXTENDED RELEASE ORAL at 09:20

## 2020-12-11 RX ADMIN — ALUMINUM HYDROXIDE AND MAGNESIUM HYDROXIDE PRN ML: 200; 200 SUSPENSION ORAL at 02:15

## 2020-12-11 NOTE — PCM.PN
- General Info


Date of Service: 12/11/20


Subjective Update: 





Deena states she short of breath today, having some wheezing. The Maalox with 

lidocaine is helping her stomach, she is not dry heaving this morning. She had 

stated yesterday her stomach is a chronic issue, had ulcers in the past but not 

this bad. Had EGD 2 years ago with Dr Domínguez with gastritis. No bloody stools 

per staff. Abdomen with chest x-ray showed some atelectasis and ileus on right. 





- Patient Data


Vitals - Most Recent: 


                                Last Vital Signs











Temp  97.4 F   12/11/20 08:00


 


Pulse  81   12/11/20 08:00


 


Resp  16   12/11/20 08:00


 


BP  192/81 H  12/11/20 08:00


 


Pulse Ox  96   12/11/20 08:00








                                        





Orthostatic Blood Pressure [     117/52


Standing]                        


Orthostatic Blood Pressure [     165/49


Sitting]                         








Weight - Most Recent: 164 lb


I&O - Last 24 Hours: 


                                 Intake & Output











 12/10/20 12/11/20 12/11/20





 22:59 06:59 14:59


 


Intake Total 1250 640 


 


Balance 1250 640 











Lab Results Last 24 Hours: 


                         Laboratory Results - last 24 hr











  12/11/20 12/11/20 Range/Units





  08:15 08:15 


 


Hgb  7.6 L   (11.4-15.5)  g/dL


 


Hct  23.7 L   (34.2-48.2)  %


 


Sodium   141  (135-145)  mmol/L


 


Potassium   4.3  (3.5-5.3)  mmol/L


 


Chloride   105  (100-110)  mmol/L


 


Carbon Dioxide   28  (21-32)  mmol/L


 


BUN   19 H  (7-18)  mg/dL


 


Creatinine   1.5 H  (0.55-1.02)  mg/dL


 


Est Cr Clr Drug Dosing   23.66  mL/min


 


Estimated GFR (MDRD)   33 L  (>60)  


 


BUN/Creatinine Ratio   12.7  (9-20)  


 


Glucose   86  ()  mg/dL


 


Calcium   8.2 L  (8.6-10.2)  mg/dL











Med Orders - Current: 


                               Current Medications





Acetaminophen (Tylenol Extra Strength)  500 mg PO Q6H [facility]LLEN


   Last Admin: 12/11/20 06:26 Dose:  500 mg


   Documented by: 


Albuterol (Ventolin Hfa)  0 gm INH Q4H PRN


   PRN Reason: Shortness of Breath


Albuterol (Proventil Neb Soln)  2.5 mg NEB QID PRN


   PRN Reason: Shortness of Breath


   Last Admin: 12/11/20 09:19 Dose:  2.5 mg


   Documented by: 


Amlodipine Besylate (Norvasc)  5 mg PO BEDTIME [facility]


   Last Admin: 12/10/20 21:28 Dose:  5 mg


   Documented by: 


Ascorbic Acid (Vitamin C)  500 mg PO DAILY [facility]


   Last Admin: 12/11/20 08:37 Dose:  500 mg


   Documented by: 


Aspirin (Halfprin)  81 mg PO BID [facility]


   Stop: 12/30/20 23:59


   Last Admin: 12/11/20 08:36 Dose:  81 mg


   Documented by: 


Aspirin (Halfprin)  81 mg PO DAILY [facility]


Atorvastatin Calcium (Lipitor)  10 mg PO BEDTIME [facility]


   Last Admin: 12/10/20 21:11 Dose:  10 mg


   Documented by: 


Al Hydroxide/Mg Hydroxide 15 (ml/ Lidocaine HCl 15 ml)  0 ml PO Q3H PRN


   PRN Reason: Dyspepsia


   Last Admin: 12/10/20 12:50 Dose:  15 ml


   Documented by: 


Docusate Sodium (Colace)  100 mg PO DAILY [facility]


   Last Admin: 12/11/20 08:35 Dose:  100 mg


   Documented by: 


Ferrous Sulfate (Ferrous Sulfate)  325 mg PO DAILY [facility]


   Last Admin: 12/11/20 08:35 Dose:  325 mg


   Documented by: 


Hydroxyzine HCl (Vistaril)  50 mg IM Q6H PRN


   PRN Reason: Nausea/Vomiting


   Last Admin: 12/09/20 22:01 Dose:  50 mg


   Documented by: 


Sodium Chloride (Normal Saline)  250 mls @ 100 mls/hr IV ASDIRECTED [facility]


Metoclopramide HCl (Reglan)  5 mg IVPUSH Q6H PRN


   PRN Reason: Nausea/Vomiting


Mometasone Furoate/Formoterol Fumar (Dulera 200-5 Mcg)  2 puff IH BID [facility]


   Last Admin: 12/11/20 08:39 Dose:  2 puff


   Documented by: 


Morphine Sulfate (Ms Contin)  15 mg PO BID [facility]


   Last Admin: 12/11/20 09:20 Dose:  15 mg


   Documented by: 


Nitroglycerin (Nitrostat)  0.4 mg SL Q5M PRN


   PRN Reason: Chest Pain


Ondansetron HCl (Zofran Odt)  4 mg PO Q4H PRN


   PRN Reason: Nausea


   Last Admin: 12/10/20 05:59 Dose:  4 mg


   Documented by: 


Oxybutynin Chloride (Oxybutynin)  5 mg PO BID [facility]


   Last Admin: 12/11/20 08:37 Dose:  5 mg


   Documented by: 


Pantoprazole Sodium (Protonix***)  40 mg PO BID [facility]


   Last Admin: 12/11/20 09:21 Dose:  40 mg


   Documented by: 


Polyethylene Glycol (Miralax)  17 gm PO DAILY [facility]


   Last Admin: 12/11/20 08:36 Dose:  17 gm


   Documented by: 


Senna/Docusate Sodium (Senna Plus)  1 tab PO BID [facility]


   Last Admin: 12/11/20 08:37 Dose:  1 tab


   Documented by: 


Simethicone (Simethicone)  80 mg PO QIDPCANDBED PRN


   PRN Reason: Gas


Sucralfate (Carafate)  1 gm PO QIDACANDBED [facility]


   Last Admin: 12/11/20 07:06 Dose:  1 gm


   Documented by: 


Venlafaxine HCl (Effexor Xr)  150 mg PO DAILY [facility]


   Last Admin: 12/11/20 08:35 Dose:  150 mg


   Documented by: 





Discontinued Medications





Acetaminophen (Tylenol)  650 mg PO Q4H PRN


   PRN Reason: mild pain


   Last Admin: 12/04/20 06:54 Dose:  650 mg


   Documented by: 


Al Hydroxide/Mg Hydroxide (Mag-Al Susp)  30 ml PO Q4H PRN


   PRN Reason: Heartburn


   Last Admin: 12/07/20 12:15 Dose:  30 ml


   Documented by: 


Al Hydroxide/Mg Hydroxide 15 (ml/ Lidocaine HCl 15 ml)  0 ml PO ONETIME ONE


   Stop: 12/08/20 11:32


   Last Admin: 12/08/20 12:31 Dose:  30 ml


   Documented by: 


Al Hydroxide/Mg Hydroxide 15 (ml/ Lidocaine HCl 15 ml)  0 ml PO ONETIME ONE


   Stop: 12/10/20 08:25


   Last Admin: 12/10/20 10:06 Dose:  30 ml


   Documented by: 


Al Hydroxide/Mg Hydroxide 15 (ml/ Lidocaine HCl 15 ml)  0 ml PO Q4H PRN


   PRN Reason: Dyspepsia


Diltiazem HCl (Dilacor Xr)  240 mg PO DAILY [facility]


   Last Admin: 12/08/20 08:33 Dose:  240 mg


   Documented by: 


Diltiazem HCl (Cardizem Cd)  120 mg PO DAILY [facility]


   Last Admin: 12/09/20 08:35 Dose:  120 mg


   Documented by: 


Famotidine (Pepcid)  20 mg IVPUSH ONETIME ONE


   Stop: 12/09/20 15:09


   Last Admin: 12/09/20 15:28 Dose:  20 mg


   Documented by: 


Lactated Ringer's (Ringers, Lactated)  1,000 mls @ 50 mls/hr IV ASDIRECTED [facility]


   Last Admin: 12/11/20 08:29 Dose:  75 mls/hr


   Documented by: 


Ibuprofen (Motrin)  200 mg PO Q6H [facility]


   Last Admin: 12/08/20 12:34 Dose:  200 mg


   Documented by: 


Lactulose (Chronulac)  10 gm PO TID [facility]


   Last Admin: 12/08/20 08:30 Dose:  10 gm


   Documented by: 


Oxybutynin Chloride (Oxybutynin)  5 mg PO TID [facility]


   Last Admin: 12/09/20 17:06 Dose:  Not Given


   Documented by: 


Oxycodone HCl (Oxycodone)  5 mg PO Q4H PRN


   PRN Reason: pain 4-6/10


   Stop: 12/06/20 16:00


   Last Admin: 12/06/20 11:18 Dose:  5 mg


   Documented by: 


Oxycodone HCl (Oxycodone)  10 mg PO Q4H PRN


   PRN Reason: pain 7-10/10


   Stop: 12/06/20 16:00


   Last Admin: 12/03/20 19:00 Dose:  10 mg


   Documented by: 


Pantoprazole Sodium (Protonix***)  40 mg PO DAILY@0600 [facility]


   Last Admin: 12/10/20 05:59 Dose:  40 mg


   Documented by: 


Simethicone (Simethicone)  80 mg PO QIDPCANDBED [facility]


   Last Admin: 12/09/20 12:55 Dose:  80 mg


   Documented by: 


Sodium Polystyrene Sulfonate (Kayexalate)  15 gm PO Q6H [facility]


   Last Admin: 12/09/20 16:33 Dose:  Not Given


   Documented by: 


Tramadol HCl (Ultram)  50 mg PO Q6H PRN


   PRN Reason: Pain (severe 7-10)


   Stop: 12/10/20 23:59


   Last Admin: 12/07/20 17:01 Dose:  50 mg


   Documented by: 











- Exam


General: Alert, Oriented, Cooperative, Mild Distress


Lungs: Clear to Auscultation, Normal Respiratory Effort, Wheezing.  No: Crackles


Cardiovascular: Regular Rate, Regular Rhythm


GI/Abdominal Exam: Soft, Non-Tender, No Distention, Abnormal Bowel Sounds 

(hypoactive).  No: Guarding, Rigid, Rebound


Extremities: Pallor





Sepsis Event Note





- Evaluation


Sepsis Screening Result: No Definite Risk





- Focused Exam


Vital Signs: 


                                   Vital Signs











  Temp Pulse Resp BP Pulse Ox


 


 12/11/20 08:00  97.4 F  81  16  192/81 H  96














- Problem List & Annotations


(1) Nausea & vomiting


SNOMED Code(s): 86755311


   Code(s): R11.2 - NAUSEA WITH VOMITING, UNSPECIFIED   Status: Acute   Current 

Visit: Yes   


Qualifiers: 


   Vomiting type: unspecified 


Annotation/Comment:: Gastritis chronic, last EGD was 2 years ago. No perforation

seen on x-ray but some ileus. Add reglan 5 mg as needed nausea/vomiting. Clear 

liquids.   





(2) Chronic kidney disease (CKD), stage III (moderate)


SNOMED Code(s): 712140354


   Code(s): N18.30 - CHRONIC KIDNEY DISEASE, STAGE 3 UNSPECIFIED   Status: 

Chronic   Current Visit: Yes   Annotation/Comment:: Cr 1.5 today. Saline lock IV

fluids.   





(3) H/O total hip arthroplasty


SNOMED Code(s): 043086301471, 643810567261


   Code(s): Z96.649 - PRESENCE OF UNSPECIFIED ARTIFICIAL HIP JOINT   Status: 

Acute   Current Visit: Yes   


Qualifiers: 


   Laterality: left   Qualified Code(s): Z96.642 - Presence of left artificial h

ip joint   





(4) Anemia


SNOMED Code(s): 257188262


   Code(s): D64.9 - ANEMIA, UNSPECIFIED   Status: Chronic   Current Visit: Yes  




Qualifiers: 


   Anemia type: iron deficiency 


Annotation/Comment:: Hgb down to 7.6 today, symptomatic with shortness of 

breath. Transfuse 1 unit of PRBCs.


repeat hgb tomorrow.    





(5) MDD (major depressive disorder)


SNOMED Code(s): 198373524


   Code(s): F32.9 - MAJOR DEPRESSIVE DISORDER, SINGLE EPISODE, UNSPECIFIED   

Status: Chronic   Current Visit: Yes   


Qualifiers: 


   Major depression recurrence: recurrent   Active/Remission status: currently 

active   Major depression episode severity: moderate   Qualified Code(s): F33.1 

- Major depressive disorder, recurrent, moderate   





(6) CAD (coronary artery disease)


SNOMED Code(s): 27118589


   Code(s): I25.10 - ATHSCL HEART DISEASE OF NATIVE CORONARY ARTERY W/O ANG 

PCTRS   Status: Chronic   Current Visit: Yes   


Qualifiers: 


   Coronary Disease-Associated Artery/Lesion type: native artery   Native vs. 

transplanted heart: native heart   Associated angina: with stable angina   

Qualified Code(s): I25.118 - Atherosclerotic heart disease of native coronary 

artery with other forms of angina pectoris   





(7) Constipation


SNOMED Code(s): 54819727


   Code(s): K59.00 - CONSTIPATION, UNSPECIFIED   Status: Chronic   Current Vi

sit: Yes   


Qualifiers: 


   Constipation type: unspecified constipation type   Qualified Code(s): K59.00 

- Constipation, unspecified   





(8) HTN (hypertension)


SNOMED Code(s): 71373481


   Code(s): I10 - ESSENTIAL (PRIMARY) HYPERTENSION   Status: Chronic   Current 

Visit: Yes   


Qualifiers: 


   Hypertension type: essential hypertension   Qualified Code(s): I10 - 

Essential (primary) hypertension   





(9) Osteoarthritis


SNOMED Code(s): 426163370


   Code(s): M19.90 - UNSPECIFIED OSTEOARTHRITIS, UNSPECIFIED SITE   Status: 

Chronic   Current Visit: Yes   


Qualifiers: 


   Osteoarthritis location: multiple joints 





(10) Gastritis


SNOMED Code(s): 4268019


   Code(s): K29.70 - GASTRITIS, UNSPECIFIED, WITHOUT BLEEDING   Status: Chronic 

 Current Visit: Yes   


Qualifiers: 


   Chronicity: chronic   Gastritis bleeding: presence of bleeding unspecified 





- Problem List Review


Problem List Initiated/Reviewed/Updated: Yes





- My Orders


Last 24 Hours: 


My Active Orders





12/10/20 Lunch


Clear Liquid Diet [DIET] 





12/10/20 13:00


Alum Hydroxide/Mag Hydroxide [Mag-Al Susp] 15 ml Lidocaine 2% [Xylocaine 2% 

Viscous] 15 ml PO Q3H 





12/10/20 17:30


Sucralfate [Carafate]   1 gm PO QIDACANDBED 





12/10/20 21:00


Pantoprazole [ProTONIX***]   40 mg PO BID 


amLODIPine [Norvasc]   5 mg PO BEDTIME 





12/11/20 09:02


Metoclopramide [Reglan]   5 mg IVPUSH Q6H PRN 





12/11/20 09:03


Incentive Spirometry [RT Incentive Spirometry] [RC] Q1HWA 





12/11/20 09:05


Transfuse PRBC [Transfuse Red Blood Cells] [COMM] Routine 





12/11/20 09:07


OCCULT BLOOD SCREEN [OP] Routine 





12/11/20 09:15


Sodium Chloride 0.9% [Normal Saline] 250 ml IV ASDIRECTED 





12/11/20 09:21


RED BLOOD CELLS LP [BBK] Routine 


TYPE AND SCREEN [BBK] Routine 





12/12/20 06:00


BASIC METABOLIC PANEL,BMP [CHEM] Routine 


CBC WITH AUTO DIFF [HEME] Routine 














- Plan


Plan:: 


1. Acute on CKD: Cr 1.5, saline lock.


2. Nausea/vomiting: GI cocktail q3h as needed, Zofran, & Reglan as needed. 

Pantoprazole scheduled bid with Carafate slurry qid & AC.


3. HTN: Amlodipine 5 mg at bedtime.


4. Anemia: transfuse 1 unit of PRBCs repeat hgb tomorrow. Occult stool ordered.


5. Left CARRILLO: continue PT/OT, home MSContin bid, tylenol 500 mg q6h scheduled.

## 2020-12-12 RX ADMIN — MOMETASONE FUROATE AND FORMOTEROL FUMARATE DIHYDRATE SCH PUFF: 200; 5 AEROSOL RESPIRATORY (INHALATION) at 08:07

## 2020-12-12 RX ADMIN — MORPHINE SULFATE SCH MG: 15 TABLET, EXTENDED RELEASE ORAL at 20:37

## 2020-12-12 RX ADMIN — ONDANSETRON PRN MG: 4 TABLET, ORALLY DISINTEGRATING ORAL at 20:43

## 2020-12-12 RX ADMIN — MORPHINE SULFATE SCH MG: 15 TABLET, EXTENDED RELEASE ORAL at 08:10

## 2020-12-12 RX ADMIN — MOMETASONE FUROATE AND FORMOTEROL FUMARATE DIHYDRATE SCH PUFF: 200; 5 AEROSOL RESPIRATORY (INHALATION) at 20:30

## 2020-12-13 RX ADMIN — MOMETASONE FUROATE AND FORMOTEROL FUMARATE DIHYDRATE SCH PUFF: 200; 5 AEROSOL RESPIRATORY (INHALATION) at 08:46

## 2020-12-13 RX ADMIN — MOMETASONE FUROATE AND FORMOTEROL FUMARATE DIHYDRATE SCH PUFF: 200; 5 AEROSOL RESPIRATORY (INHALATION) at 20:05

## 2020-12-13 RX ADMIN — MORPHINE SULFATE SCH MG: 15 TABLET, EXTENDED RELEASE ORAL at 20:08

## 2020-12-13 RX ADMIN — MORPHINE SULFATE SCH MG: 15 TABLET, EXTENDED RELEASE ORAL at 08:48

## 2020-12-14 RX ADMIN — MOMETASONE FUROATE AND FORMOTEROL FUMARATE DIHYDRATE SCH PUFF: 200; 5 AEROSOL RESPIRATORY (INHALATION) at 20:29

## 2020-12-14 RX ADMIN — MOMETASONE FUROATE AND FORMOTEROL FUMARATE DIHYDRATE SCH PUFF: 200; 5 AEROSOL RESPIRATORY (INHALATION) at 08:48

## 2020-12-14 RX ADMIN — MORPHINE SULFATE SCH MG: 15 TABLET, EXTENDED RELEASE ORAL at 20:30

## 2020-12-14 RX ADMIN — MORPHINE SULFATE SCH MG: 15 TABLET, EXTENDED RELEASE ORAL at 08:52

## 2020-12-15 RX ADMIN — MORPHINE SULFATE SCH MG: 15 TABLET, EXTENDED RELEASE ORAL at 20:58

## 2020-12-15 RX ADMIN — MORPHINE SULFATE SCH MG: 15 TABLET, EXTENDED RELEASE ORAL at 08:11

## 2020-12-15 RX ADMIN — MOMETASONE FUROATE AND FORMOTEROL FUMARATE DIHYDRATE SCH PUFF: 200; 5 AEROSOL RESPIRATORY (INHALATION) at 08:05

## 2020-12-15 RX ADMIN — MOMETASONE FUROATE AND FORMOTEROL FUMARATE DIHYDRATE SCH PUFF: 200; 5 AEROSOL RESPIRATORY (INHALATION) at 20:57

## 2020-12-16 RX ADMIN — MORPHINE SULFATE SCH MG: 15 TABLET, EXTENDED RELEASE ORAL at 08:49

## 2020-12-16 RX ADMIN — MOMETASONE FUROATE AND FORMOTEROL FUMARATE DIHYDRATE SCH PUFF: 200; 5 AEROSOL RESPIRATORY (INHALATION) at 08:47

## 2020-12-16 NOTE — [id number].DCSUM1
**Discharge Summary





- Hospital Course


HPI Initial Comments: 





Deena is a 81 yo female who had CARRILLO( left) last week 11/30. She is here for 

rehab. She complains of being tired, but her hip pain is much improved, and 

relived by Tylenol. She had post anemia( hgb 7.9) on discharge. Her past medical

history is extensive, with HTN, CAD, COPD, chronic chest pain, anxiety, 

Constipation, CKD Stage III, gastritis. She had stress test prior to surgery 

that was negative. She had complete heart workup done after surgery to due 

persistent chest pain which was negative. She had been started on Lactulose in 

[place] for constipation, also had Losartan & Spironolactone discontinued due to 

hyperkalemia on admission to [place].  


Diagnosis: Stroke: No





- Discharge Data


Discharge Date: 12/16/20 ([facility])


Discharge Disposition: Home, W Home Health Agency 06


Condition: Stable





- Referral to Home Health


Date of Face to Face Encounter: 12/16/20


Reason for Homebound Status: Recent hip replacement


Primary Care Physician: 


Niko Babin MD





Skilled Need: PT/OT, nursing for assessment, medication managment, teaching of 

medications & disease, blood pressure monitoring.





- Discharge Diagnosis/Problem(s)


(1) Chronic kidney disease (CKD), stage III (moderate)


SNOMED Code(s): 394777181


   ICD Code: N18.30 - CHRONIC KIDNEY DISEASE, STAGE 3 UNSPECIFIED   Status: 

Chronic   Current Visit: Yes   Problem Details: Cr 1.3 12/15   





(2) H/O total hip arthroplasty


SNOMED Code(s): 882352825719, 045424742496


   ICD Code: Z96.649 - PRESENCE OF UNSPECIFIED ARTIFICIAL HIP JOINT   Status: 

Acute   Current Visit: Yes   Onset Date: ~11/30/20   


Qualifiers: 


   Laterality: left   Qualified Code(s): Z96.642 - Presence of left artificial 

hip joint   





(3) Gastritis


SNOMED Code(s): 2058798


   ICD Code: K29.70 - GASTRITIS, UNSPECIFIED, WITHOUT BLEEDING   Status: Chronic

  Current Visit: Yes   Problem Details: Protonix 40 mg bid, Carafate 1 gm po 

qidac.


Recheck in 4-6 weeks with Dr Babin on weaning down.    


Qualifiers: 


   Chronicity: chronic   Gastritis bleeding: presence of bleeding unspecified 





(4) Anemia


SNOMED Code(s): 259096239


   ICD Code: D64.9 - ANEMIA, UNSPECIFIED   Status: Chronic   Current Visit: Yes 

 Problem Details: Hgb 9.8 12/15, received 1 unit PRBCs on 12/11, continues to 

improve.


Recheck with PCP on follow up.   


Qualifiers: 


   Anemia type: iron deficiency 





(5) MDD (major depressive disorder)


SNOMED Code(s): 343824253


   ICD Code: F32.9 - MAJOR DEPRESSIVE DISORDER, SINGLE EPISODE, UNSPECIFIED   

Status: Chronic   Current Visit: Yes   


Qualifiers: 


   Major depression recurrence: recurrent   Active/Remission status: currently 

active   Major depression episode severity: moderate   Qualified Code(s): F33.1 

- Major depressive disorder, recurrent, moderate   





(6) CAD (coronary artery disease)


SNOMED Code(s): 44749834


   ICD Code: I25.10 - ATHSCL HEART DISEASE OF NATIVE CORONARY ARTERY W/O ANG 

PCTRS   Status: Chronic   Current Visit: Yes   


Qualifiers: 


   Coronary Disease-Associated Artery/Lesion type: native artery   Native vs. 

transplanted heart: native heart   Associated angina: with stable angina   

Qualified Code(s): I25.118 - Atherosclerotic heart disease of native coronary 

artery with other forms of angina pectoris   





(7) Constipation


SNOMED Code(s): 69998968


   ICD Code: K59.00 - CONSTIPATION, UNSPECIFIED   Status: Chronic   Current 

Visit: Yes   


Qualifiers: 


   Constipation type: unspecified constipation type   Qualified Code(s): K59.00 

- Constipation, unspecified   





(8) HTN (hypertension)


SNOMED Code(s): 44249150


   ICD Code: I10 - ESSENTIAL (PRIMARY) HYPERTENSION   Status: Chronic   Current 

Visit: Yes   Problem Details: Elevated, had to discontinue Diltiazem for marked 

bradycardia in 40s. Losartan & Spironolactone were discontinued in [place] due to 

hyperkalemia. Restarted Metoprolol 12.5 mg bid, amlodpine 10 mg bedtime, Imdur 

30 mg daily, she feels well, no dizziness or lightheadedness when getting up 

with therapy, no headaches or blurred vision. Will have Home health continue to 

monitor and close follow up with Dr Babin [facility].   


Qualifiers: 


   Hypertension type: essential hypertension   Qualified Code(s): I10 - 

Essential (primary) hypertension   





(9) Osteoarthritis


SNOMED Code(s): 140395316


   ICD Code: M19.90 - UNSPECIFIED OSTEOARTHRITIS, UNSPECIFIED SITE   Status: Chr

onic   Current Visit: Yes   


Qualifiers: 


   Osteoarthritis location: multiple joints 





(10) Nausea & vomiting


SNOMED Code(s): 93349765


   ICD Code: R11.2 - NAUSEA WITH VOMITING, UNSPECIFIED   Status: Resolved   

Current Visit: Yes   Problem Details: Gastritis chronic, last EGD was 2 years 

ago.


Resolved.   


Qualifiers: 


   Vomiting type: unspecified 





- Patient Summary/Data


Consults: 


                                  Consultations





12/03/20 14:47


OT Evaluation and Treatment [CONS] Routine 


   Please Evaluate and Treat.


   OT Reason for Consult: ADL's


   This query below is only for informational purposes and is not editable.


PT Evaluation and Treatment [CONS] Routine 


   Please Evaluate and Treat.


   PT Reason for Consult: Ambulation


   This query below is only for informational purposes and is not editable.











Hospital Course: 





Deena admitted on 12/3 for rehab for Left total hip replacement, developed 

more nausea & vomiting on 12/7, oxycodone was discontinued and switched to 

Tramadol. During OT session on 12/08 had retching and central chest pain, 

troponin was negative, EKG showed junctional rhythm, bradycardia. Her Diltiazem 

was decreased to 120 mg daily 12/07 then discontinued, her pulse came up 80s on 

12/09. Still was having nausea and dry heaving to where she was refusing meals 

and doing therapy. Lactulose was discontinued as she stated she had stomach pain

 every time she got it. GI cocktail was only thing that helped with pain, so 

this was ordered every 3 hours as needed, stated her pain was same as it was in 

[place] and prior to her surgery, like when she had an ulcer. Her last EGD with Dr Domínguez was 2 years ago and showed gastritis, her pantoprazole was increased to 

40 mg bid, added Carafate 1 gm tablet in 15-30 ml water slurried qidac&bedtime, 

her symptoms resolved and was able to advance her diet on Friday 12/11. Her 

potassium was recheck on 12/04 which was normal range. Her labs on 12/08 when 

she had chest pain showed elevated potassium of 5.9, Creatinine 2.2, she also 

had orthostatic hypotension drop of 50 points with PT/OT, started on IV fluids, 

saline locked on 12/11. Oxybutynin was decreased to bid as can also cause some 

orthostatic hypotension. Milk of magnesia was discontinued, albuterol nebs 

ordered changed to also cover for hyperkalemia. Repeat potassium was recheck at 

6.2, given Kayexalate had 2 good bowel movements, repeat potassium was 5.2, 

continued to trend down, was 4.2 on 12/15. Creatinine trended down and was 1.3 

on 12/15. Amlodipine was started for her blood pressure evening of 10th as over 

75% of Diltiazem would be out of her system, this was titrated up to 10 mg, 

Metoprolol was restarted at previous home dose of 25 mg bid, she was 

bradycardiac with this so decreased to 12.5 mg bid. Added Imdur 15 mg daily, no 

significant change with pressures so increased to 30 mg daily. She is 

asymptomatic with her blood pressures. Will have close follow up with Dr Babin 

as outpatient. Her Hgb on 12/11 was 7.6, was having shortness of breath so 

transfused 1 unit of PRBCs, Hgb came up to 9.1 and 9.8 on 12/15. Patient was 

able to resume PT/OT on Saturday 12/12, has progressed well, discharge today w

[facility] health services in the care of her son, Earl. 





- Patient Instructions


Diet: Usual Diet as Tolerated


Activity: As Tolerated


Showering/Bathing: May Shower


Notify Provider of: Fever, Increased Pain, Nausea and/or Vomiting


Other/Special Instructions: Follow up with Dr Babin in 3-4 days for recheck of 

your blood pressures and your hemoglobin(anemia).  [facility] 

will be be providing PT/OT/nursing services on your discharge.





- Discharge Plan


*PRESCRIPTION DRUG MONITORING PROGRAM REVIEWED*: Yes


*COPY OF PRESCRIPTION DRUG MONITORING REPORT IN PATIENT OZ: Not Applicable


Prescriptions/Med Rec: 


amLODIPine Besylate [Amlodipine Besylate] 10 mg PO BEDTIME 30 Days #30 tablet


Isosorbide Mononitrate [Imdur] 30 mg PO DAILY 30 Days #30 tab.er


Pantoprazole [ProTONIX***] 40 mg PO BID 30 Days #60 tab.cr


Sucralfate 1 gm PO QIDACANDBED 30 Days #120 tablet


Home Medications: 


                                    Home Meds





atorvaSTATin [Lipitor] 10 mg PO BEDTIME 11/20/18 [History]


Acetaminophen [Tylenol] 650 mg PO Q4H PRN 12/03/20 [History]


Albuterol Sulfate 3 ml IH QID PRN 12/03/20 [History]


Albuterol [Ventolin HFA] 2 puff IH Q4H PRN 12/03/20 [History]


Aspirin [Halfprin] 81 mg PO BID 12/03/20 [History]


Docusate Sodium 100 mg PO DAILY 12/03/20 [History]


Docusate Sodium/Sennosides [Senna Plus] 1 tab PO BID 12/03/20 [History]


Ferrous Fumarate/Vitamin C [Vitron-C] 1 tab PO DAILY 12/03/20 [History]


Fluticasone Propion/Salmeterol [Advair 250-50 Diskus] 1 puff IH BID 12/03/20 

[History]


Morphine [MS Contin] 15 mg PO BID 12/03/20 [History]


Ondansetron [Zofran ODT] 4 mg PO Q4H PRN 12/03/20 [History]


Venlafaxine [Effexor XR] 150 mg PO DAILY 12/03/20 [History]


polyethylene glycoL 3350 [MiraLAX] 17 gm PO DAILY 12/03/20 [History]


Alum Hydroxide/Mag Hydroxide [Mag-Al] 15 ml PO Q3H PRN  cup 12/16/20 [Rx]


Isosorbide Mononitrate [Imdur] 30 mg PO DAILY 30 Days #30 tab.er 12/16/20 [Rx]


Metoprolol Tartrate 12.5 mg PO BID 30 Days #30 tab 12/16/20 [Rx]


Oxybutynin 5 mg PO BID 30 Days #60 tab 12/16/20 [Rx]


Pantoprazole [ProTONIX***] 40 mg PO BID 30 Days #60 tab.cr 12/16/20 [Rx]


Sucralfate 1 gm PO QIDACANDBED 30 Days #120 tablet 12/16/20 [Rx]


amLODIPine Besylate [Amlodipine Besylate] 10 mg PO BEDTIME 30 Days #30 tablet 

12/16/20 [Rx]








Oxygen Therapy Mode: Room Air


Patient Handouts:  Sucralfate tablets, Pantoprazole tablets, Fall Prevention in 

Hospitals, Adult, Venous Thromboembolism Prevention


Referrals: 


Niko Babin MD [Primary Care Provider] - 





- Discharge Summary/Plan Comment


DC Time >30 min.: Yes





- General Info


Date of Service: 12/16/20


Subjective Update: 





Deena feeling well today, no chest pain, no stomach pain, no nausea or 

vomiting. No lightheadedness or dizziness with activity. No headaches or change 

in vision. 


Functional Status: Reports: Pain Controlled, Tolerating Diet, Ambulating, 

Urinating.  Denies: New Symptoms





- Patient Data


Vitals - Most Recent: 


                                Last Vital Signs











Temp  98.1 F   12/15/20 21:00


 


Pulse  65   12/16/20 08:50


 


Resp  18   12/16/20 00:20


 


BP  165/55 H  12/16/20 08:51


 


Pulse Ox  96   12/15/20 21:00








                                        





Orthostatic Blood Pressure [     117/52


Standing]                        


Orthostatic Blood Pressure [     165/49


Sitting]                         








Weight - Most Recent: 164 lb


Med Orders - Current: 


                               Current Medications





Acetaminophen (Tylenol Extra Strength)  500 mg PO Q6H [facility]


   Last Admin: 12/16/20 07:00 Dose:  500 mg


   Documented by: 


Albuterol (Ventolin Hfa)  0 gm INH Q4H PRN


   PRN Reason: Shortness of Breath


Albuterol (Proventil Neb Soln)  2.5 mg NEB QID PRN


   PRN Reason: Shortness of Breath


   Last Admin: 12/11/20 09:19 Dose:  2.5 mg


   Documented by: 


Amlodipine Besylate (Norvasc)  10 mg PO BEDTIME [facility]


   Last Admin: 12/15/20 20:58 Dose:  10 mg


   Documented by: 


Ascorbic Acid (Vitamin C)  500 mg PO DAILY [facility]


   Last Admin: 12/16/20 08:52 Dose:  500 mg


   Documented by: 


Aspirin (Halfprin)  81 mg PO BID [facility]


   Stop: 12/30/20 23:59


   Last Admin: 12/16/20 08:49 Dose:  81 mg


   Documented by: 


Aspirin (Halfprin)  81 mg PO DAILY [facility]


Atorvastatin Calcium (Lipitor)  10 mg PO BEDTIME [facility]


   Last Admin: 12/15/20 20:57 Dose:  10 mg


   Documented by: 


Al Hydroxide/Mg Hydroxide 15 (ml/ Lidocaine HCl 15 ml)  0 ml PO Q3H PRN


   PRN Reason: Dyspepsia


   Last Admin: 12/11/20 02:15 Dose:  30 ml


   Documented by: 


Docusate Sodium (Colace)  100 mg PO DAILY [facility]


   Last Admin: 12/16/20 08:47 Dose:  100 mg


   Documented by: 


Ferrous Sulfate (Ferrous Sulfate)  325 mg PO DAILY [facility]


   Last Admin: 12/16/20 08:48 Dose:  325 mg


   Documented by: 


Hydroxyzine HCl (Vistaril)  50 mg IM Q6H PRN


   PRN Reason: Nausea/Vomiting


   Last Admin: 12/09/20 22:01 Dose:  50 mg


   Documented by: 


Isosorbide Mononitrate (Imdur)  30 mg PO DAILY [facility]


   Last Admin: 12/16/20 08:51 Dose:  30 mg


   Documented by: 


Metoclopramide HCl (Reglan)  5 mg PO Q6H PRN


   PRN Reason: Nausea/Vomiting


Metoprolol Tartrate (Lopressor)  12.5 mg PO BID [facility]


   Last Admin: 12/16/20 08:50 Dose:  12.5 mg


   Documented by: 


Mometasone Furoate/Formoterol Fumar (Dulera 200-5 Mcg)  2 puff IH BID [facility]


   Last Admin: 12/16/20 08:47 Dose:  2 puff


   Documented by: 


Morphine Sulfate (Ms Contin)  15 mg PO BID [facility]


   Last Admin: 12/16/20 08:49 Dose:  15 mg


   Documented by: 


Nitroglycerin (Nitrostat)  0.4 mg SL Q5M PRN


   PRN Reason: Chest Pain


Ondansetron HCl (Zofran Odt)  4 mg PO Q4H PRN


   PRN Reason: Nausea


   Last Admin: 12/12/20 20:43 Dose:  4 mg


   Documented by: 


Oxybutynin Chloride (Oxybutynin)  5 mg PO BID [facility]


   Last Admin: 12/16/20 08:50 Dose:  5 mg


   Documented by: 


Pantoprazole Sodium (Protonix***)  40 mg PO BID [facility]


   Last Admin: 12/16/20 08:51 Dose:  40 mg


   Documented by: 


Polyethylene Glycol (Miralax)  17 gm PO DAILY [facility]


   Last Admin: 12/16/20 08:51 Dose:  Not Given


   Documented by: 


Senna/Docusate Sodium (Senna Plus)  1 tab PO BID [facility]


   Last Admin: 12/16/20 08:51 Dose:  1 tab


   Documented by: 


Simethicone (Simethicone)  80 mg PO QIDPCANDBED PRN


   PRN Reason: Gas


Sucralfate (Carafate)  1 gm PO QIDACANDBED [facility]


   Last Admin: 12/16/20 07:00 Dose:  1 gm


   Documented by: 


Venlafaxine HCl (Effexor Xr)  150 mg PO DAILY [facility]


   Last Admin: 12/16/20 08:48 Dose:  150 mg


   Documented by: 





Discontinued Medications





Acetaminophen (Tylenol)  650 mg PO Q4H PRN


   PRN Reason: mild pain


   Last Admin: 12/04/20 06:54 Dose:  650 mg


   Documented by: 


Al Hydroxide/Mg Hydroxide (Mag-Al Susp)  30 ml PO Q4H PRN


   PRN Reason: Heartburn


   Last Admin: 12/07/20 12:15 Dose:  30 ml


   Documented by: 


Amlodipine Besylate (Norvasc)  5 mg PO BEDTIME [facility]


   Last Admin: 12/13/20 20:09 Dose:  5 mg


   Documented by: 


Al Hydroxide/Mg Hydroxide 15 (ml/ Lidocaine HCl 15 ml)  0 ml PO ONETIME ONE


   Stop: 12/08/20 11:32


   Last Admin: 12/08/20 12:31 Dose:  30 ml


   Documented by: 


Al Hydroxide/Mg Hydroxide 15 (ml/ Lidocaine HCl 15 ml)  0 ml PO ONETIME ONE


   Stop: 12/10/20 08:25


   Last Admin: 12/10/20 10:06 Dose:  30 ml


   Documented by: 


Al Hydroxide/Mg Hydroxide 15 (ml/ Lidocaine HCl 15 ml)  0 ml PO Q4H PRN


   PRN Reason: Dyspepsia


Diltiazem HCl (Dilacor Xr)  240 mg PO DAILY [facility]


   Last Admin: 12/08/20 08:33 Dose:  240 mg


   Documented by: 


Diltiazem HCl (Cardizem Cd)  120 mg PO DAILY [facility]


   Last Admin: 12/09/20 08:35 Dose:  120 mg


   Documented by: 


Famotidine (Pepcid)  20 mg IVPUSH ONETIME ONE


   Stop: 12/09/20 15:09


   Last Admin: 12/09/20 15:28 Dose:  20 mg


   Documented by: 


Lactated Ringer's (Ringers, Lactated)  1,000 mls @ 50 mls/hr IV ASDIRECTED [facility]


   Last Admin: 12/11/20 08:29 Dose:  75 mls/hr


   Documented by: 


Sodium Chloride (Normal Saline)  250 mls @ 100 mls/hr IV ASDIRECTED [facility]


Ibuprofen (Motrin)  200 mg PO Q6H [facility]


   Last Admin: 12/08/20 12:34 Dose:  200 mg


   Documented by: 


Isosorbide Mononitrate (Imdur)  15 mg PO DAILY [facility]


   Last Admin: 12/15/20 08:33 Dose:  15 mg


   Documented by: 


Lactulose (Chronulac)  10 gm PO TID [facility]


   Last Admin: 12/08/20 08:30 Dose:  10 gm


   Documented by: 


Metoclopramide HCl (Reglan)  5 mg IVPUSH Q6H PRN


   PRN Reason: Nausea/Vomiting


Metoprolol Tartrate (Lopressor)  25 mg PO BID [facility]


   Last Admin: 12/14/20 08:52 Dose:  25 mg


   Documented by: 


Oxybutynin Chloride (Oxybutynin)  5 mg PO TID [facility]


   Last Admin: 12/09/20 17:06 Dose:  Not Given


   Documented by: 


Oxycodone HCl (Oxycodone)  5 mg PO Q4H PRN


   PRN Reason: pain 4-6/10


   Stop: 12/06/20 16:00


   Last Admin: 12/06/20 11:18 Dose:  5 mg


   Documented by: 


Oxycodone HCl (Oxycodone)  10 mg PO Q4H PRN


   PRN Reason: pain 7-10/10


   Stop: 12/06/20 16:00


   Last Admin: 12/03/20 19:00 Dose:  10 mg


   Documented by: 


Pantoprazole Sodium (Protonix***)  40 mg PO DAILY@0600 [facility]


   Last Admin: 12/10/20 05:59 Dose:  40 mg


   Documented by: 


Simethicone (Simethicone)  80 mg PO QIDPCANDBED [facility]


   Last Admin: 12/09/20 12:55 Dose:  80 mg


   Documented by: 


Sodium Polystyrene Sulfonate (Kayexalate)  15 gm PO Q6H [facility]


   Last Admin: 12/09/20 16:33 Dose:  Not Given


   Documented by: 


Tramadol HCl (Ultram)  50 mg PO Q6H PRN


   PRN Reason: Pain (severe 7-10)


   Stop: 12/10/20 23:59


   Last Admin: 12/07/20 17:01 Dose:  50 mg


   Documented by: 











- Exam


Lungs: Reports: Clear to Auscultation, Normal Respiratory Effort


Cardiovascular: Reports: Regular Rate, Regular Rhythm


GI/Abdominal Exam: Normal Bowel Sounds, Soft, Non-Tender, No Distention


Skin: Reports: Warm, Dry, Intact, Ecchymosis (left hand)

## 2022-04-27 ENCOUNTER — HOSPITAL ENCOUNTER (EMERGENCY)
Dept: HOSPITAL 7 - FB.ED | Age: 82
Discharge: HOME | End: 2022-04-27
Payer: MEDICARE

## 2022-04-27 DIAGNOSIS — Z72.0: ICD-10-CM

## 2022-04-27 DIAGNOSIS — I10: ICD-10-CM

## 2022-04-27 DIAGNOSIS — E78.00: ICD-10-CM

## 2022-04-27 DIAGNOSIS — Z79.82: ICD-10-CM

## 2022-04-27 DIAGNOSIS — U07.1: Primary | ICD-10-CM

## 2022-04-27 DIAGNOSIS — Z79.899: ICD-10-CM

## 2022-04-27 RX ADMIN — ONDANSETRON ONE MG: 2 INJECTION, SOLUTION INTRAMUSCULAR; INTRAVENOUS at 14:47
